# Patient Record
Sex: MALE | Race: WHITE | NOT HISPANIC OR LATINO | Employment: FULL TIME
[De-identification: names, ages, dates, MRNs, and addresses within clinical notes are randomized per-mention and may not be internally consistent; named-entity substitution may affect disease eponyms.]

---

## 2017-01-05 ENCOUNTER — RECORDS - HEALTHEAST (OUTPATIENT)
Dept: ADMINISTRATIVE | Facility: OTHER | Age: 27
End: 2017-01-05

## 2017-03-09 ENCOUNTER — RECORDS - HEALTHEAST (OUTPATIENT)
Dept: ADMINISTRATIVE | Facility: OTHER | Age: 27
End: 2017-03-09

## 2017-11-16 ENCOUNTER — RECORDS - HEALTHEAST (OUTPATIENT)
Dept: ADMINISTRATIVE | Facility: OTHER | Age: 27
End: 2017-11-16

## 2018-03-16 ENCOUNTER — RECORDS - HEALTHEAST (OUTPATIENT)
Dept: ADMINISTRATIVE | Facility: OTHER | Age: 28
End: 2018-03-16

## 2018-04-06 ENCOUNTER — RECORDS - HEALTHEAST (OUTPATIENT)
Dept: ADMINISTRATIVE | Facility: OTHER | Age: 28
End: 2018-04-06

## 2019-05-24 ENCOUNTER — COMMUNICATION - HEALTHEAST (OUTPATIENT)
Dept: TELEHEALTH | Facility: CLINIC | Age: 29
End: 2019-05-24

## 2019-05-24 ENCOUNTER — OFFICE VISIT - HEALTHEAST (OUTPATIENT)
Dept: FAMILY MEDICINE | Facility: CLINIC | Age: 29
End: 2019-05-24

## 2019-05-24 DIAGNOSIS — Z00.00 ROUTINE MEDICAL EXAM: ICD-10-CM

## 2019-05-24 DIAGNOSIS — F41.1 GENERALIZED ANXIETY DISORDER: ICD-10-CM

## 2019-05-24 DIAGNOSIS — G47.33 OBSTRUCTIVE SLEEP APNEA SYNDROME: ICD-10-CM

## 2019-05-24 RX ORDER — ACYCLOVIR 400 MG/1
TABLET ORAL
Status: SHIPPED | COMMUNITY
Start: 2019-02-15 | End: 2021-11-15

## 2019-05-24 ASSESSMENT — MIFFLIN-ST. JEOR: SCORE: 2336.24

## 2019-07-19 ENCOUNTER — OFFICE VISIT - HEALTHEAST (OUTPATIENT)
Dept: FAMILY MEDICINE | Facility: CLINIC | Age: 29
End: 2019-07-19

## 2019-07-19 DIAGNOSIS — F41.1 GENERALIZED ANXIETY DISORDER: ICD-10-CM

## 2019-07-19 RX ORDER — HYDROXYZINE HYDROCHLORIDE 25 MG/1
25 TABLET, FILM COATED ORAL EVERY 8 HOURS PRN
Qty: 90 TABLET | Refills: 5 | Status: SHIPPED | OUTPATIENT
Start: 2019-07-19 | End: 2024-03-01

## 2019-08-15 ENCOUNTER — OFFICE VISIT - HEALTHEAST (OUTPATIENT)
Dept: SLEEP MEDICINE | Facility: CLINIC | Age: 29
End: 2019-08-15

## 2019-08-15 DIAGNOSIS — R06.83 SNORING: ICD-10-CM

## 2019-08-15 DIAGNOSIS — G47.8 SLEEP DYSFUNCTION WITH SLEEP STAGE DISTURBANCE: ICD-10-CM

## 2019-08-15 DIAGNOSIS — E66.01 MORBID OBESITY (H): ICD-10-CM

## 2019-08-15 DIAGNOSIS — G47.10 HYPERSOMNIA: ICD-10-CM

## 2019-08-15 ASSESSMENT — MIFFLIN-ST. JEOR: SCORE: 2303.13

## 2019-09-03 ENCOUNTER — RECORDS - HEALTHEAST (OUTPATIENT)
Dept: SLEEP MEDICINE | Facility: CLINIC | Age: 29
End: 2019-09-03

## 2019-09-03 ENCOUNTER — RECORDS - HEALTHEAST (OUTPATIENT)
Dept: ADMINISTRATIVE | Facility: OTHER | Age: 29
End: 2019-09-03

## 2019-09-03 DIAGNOSIS — E66.01 MORBID (SEVERE) OBESITY DUE TO EXCESS CALORIES (H): ICD-10-CM

## 2019-09-03 DIAGNOSIS — G47.8 OTHER SLEEP DISORDERS: ICD-10-CM

## 2019-09-03 DIAGNOSIS — R06.83 SNORING: ICD-10-CM

## 2019-09-03 DIAGNOSIS — G47.10 HYPERSOMNIA, UNSPECIFIED: ICD-10-CM

## 2019-09-12 ENCOUNTER — COMMUNICATION - HEALTHEAST (OUTPATIENT)
Dept: SLEEP MEDICINE | Facility: CLINIC | Age: 29
End: 2019-09-12

## 2019-09-13 ENCOUNTER — COMMUNICATION - HEALTHEAST (OUTPATIENT)
Dept: SLEEP MEDICINE | Facility: CLINIC | Age: 29
End: 2019-09-13

## 2019-09-23 ENCOUNTER — COMMUNICATION - HEALTHEAST (OUTPATIENT)
Dept: SCHEDULING | Facility: CLINIC | Age: 29
End: 2019-09-23

## 2019-09-23 ENCOUNTER — OFFICE VISIT - HEALTHEAST (OUTPATIENT)
Dept: FAMILY MEDICINE | Facility: CLINIC | Age: 29
End: 2019-09-23

## 2019-09-23 DIAGNOSIS — R11.10 VOMITING AND DIARRHEA: ICD-10-CM

## 2019-09-23 DIAGNOSIS — K52.9 GASTROENTERITIS: ICD-10-CM

## 2019-09-23 DIAGNOSIS — R19.7 VOMITING AND DIARRHEA: ICD-10-CM

## 2019-09-23 LAB
ANION GAP SERPL CALCULATED.3IONS-SCNC: 11 MMOL/L (ref 5–18)
BASOPHILS # BLD AUTO: 0 THOU/UL (ref 0–0.2)
BASOPHILS NFR BLD AUTO: 0 % (ref 0–2)
BUN SERPL-MCNC: 14 MG/DL (ref 8–22)
CHLORIDE BLD-SCNC: 104 MMOL/L (ref 98–107)
CO2 SERPL-SCNC: 27 MMOL/L (ref 22–31)
CREAT SERPL-MCNC: 1 MG/DL (ref 0.8–1.5)
EOSINOPHIL # BLD AUTO: 0 THOU/UL (ref 0–0.4)
EOSINOPHIL NFR BLD AUTO: 0 % (ref 0–6)
ERYTHROCYTE [DISTWIDTH] IN BLOOD BY AUTOMATED COUNT: 12.9 % (ref 11–14.5)
GLUCOSE BLD-MCNC: 114 MG/DL (ref 70–125)
HCT VFR BLD AUTO: 44.5 % (ref 40–54)
HGB BLD-MCNC: 14.9 G/DL (ref 14–18)
LYMPHOCYTES # BLD AUTO: 0.7 THOU/UL (ref 0.8–4.4)
LYMPHOCYTES NFR BLD AUTO: 5 % (ref 20–40)
MCH RBC QN AUTO: 28.8 PG (ref 27–34)
MCHC RBC AUTO-ENTMCNC: 33.5 G/DL (ref 32–36)
MCV RBC AUTO: 86 FL (ref 80–100)
MONOCYTES # BLD AUTO: 0.6 THOU/UL (ref 0–0.9)
MONOCYTES NFR BLD AUTO: 4 % (ref 2–10)
NEUTROPHILS # BLD AUTO: 12.7 THOU/UL (ref 2–7.7)
NEUTROPHILS NFR BLD AUTO: 90 % (ref 50–70)
PLATELET # BLD AUTO: 198 THOU/UL (ref 140–440)
PMV BLD AUTO: 12 FL (ref 8.5–12.5)
POTASSIUM BLD-SCNC: 4.1 MMOL/L (ref 3.5–5.5)
RBC # BLD AUTO: 5.17 MILL/UL (ref 4.4–6.2)
SODIUM SERPL-SCNC: 142 MMOL/L (ref 136–145)
WBC: 14.1 THOU/UL (ref 4–11)

## 2020-04-02 ENCOUNTER — COMMUNICATION - HEALTHEAST (OUTPATIENT)
Dept: FAMILY MEDICINE | Facility: CLINIC | Age: 30
End: 2020-04-02

## 2020-04-10 ENCOUNTER — COMMUNICATION - HEALTHEAST (OUTPATIENT)
Dept: SCHEDULING | Facility: CLINIC | Age: 30
End: 2020-04-10

## 2020-04-10 DIAGNOSIS — J45.40 MODERATE PERSISTENT ASTHMA WITHOUT COMPLICATION: ICD-10-CM

## 2020-11-06 ENCOUNTER — OFFICE VISIT - HEALTHEAST (OUTPATIENT)
Dept: FAMILY MEDICINE | Facility: CLINIC | Age: 30
End: 2020-11-06

## 2020-11-06 DIAGNOSIS — F41.1 GENERALIZED ANXIETY DISORDER: ICD-10-CM

## 2020-11-06 DIAGNOSIS — R53.82 CHRONIC FATIGUE: ICD-10-CM

## 2020-11-06 DIAGNOSIS — J45.40 MODERATE PERSISTENT ASTHMA WITHOUT COMPLICATION: ICD-10-CM

## 2020-11-06 DIAGNOSIS — Z00.00 ROUTINE MEDICAL EXAM: ICD-10-CM

## 2020-11-06 DIAGNOSIS — Z23 NEED FOR TETANUS BOOSTER: ICD-10-CM

## 2020-11-06 DIAGNOSIS — Z13.220 SCREENING, LIPID: ICD-10-CM

## 2020-11-06 DIAGNOSIS — E66.01 CLASS 3 SEVERE OBESITY DUE TO EXCESS CALORIES WITHOUT SERIOUS COMORBIDITY WITH BODY MASS INDEX (BMI) OF 40.0 TO 44.9 IN ADULT (H): ICD-10-CM

## 2020-11-06 DIAGNOSIS — E66.813 CLASS 3 SEVERE OBESITY DUE TO EXCESS CALORIES WITHOUT SERIOUS COMORBIDITY WITH BODY MASS INDEX (BMI) OF 40.0 TO 44.9 IN ADULT (H): ICD-10-CM

## 2020-11-06 LAB
ALBUMIN SERPL-MCNC: 4.4 G/DL (ref 3.5–5)
ALP SERPL-CCNC: 61 U/L (ref 45–120)
ALT SERPL W P-5'-P-CCNC: 26 U/L (ref 0–45)
ANION GAP SERPL CALCULATED.3IONS-SCNC: 10 MMOL/L (ref 5–18)
AST SERPL W P-5'-P-CCNC: 19 U/L (ref 0–40)
BASOPHILS # BLD AUTO: 0 THOU/UL (ref 0–0.2)
BASOPHILS NFR BLD AUTO: 1 % (ref 0–2)
BILIRUB SERPL-MCNC: 0.4 MG/DL (ref 0–1)
BUN SERPL-MCNC: 17 MG/DL (ref 8–22)
CALCIUM SERPL-MCNC: 9.7 MG/DL (ref 8.5–10.5)
CHLORIDE BLD-SCNC: 104 MMOL/L (ref 98–107)
CHOLEST SERPL-MCNC: 194 MG/DL
CO2 SERPL-SCNC: 25 MMOL/L (ref 22–31)
CREAT SERPL-MCNC: 1.1 MG/DL (ref 0.7–1.3)
EOSINOPHIL # BLD AUTO: 0.2 THOU/UL (ref 0–0.4)
EOSINOPHIL NFR BLD AUTO: 4 % (ref 0–6)
ERYTHROCYTE [DISTWIDTH] IN BLOOD BY AUTOMATED COUNT: 13.5 % (ref 11–14.5)
FASTING STATUS PATIENT QL REPORTED: YES
GFR SERPL CREATININE-BSD FRML MDRD: >60 ML/MIN/1.73M2
GLUCOSE BLD-MCNC: 93 MG/DL (ref 70–125)
HCT VFR BLD AUTO: 41.3 % (ref 40–54)
HDLC SERPL-MCNC: 47 MG/DL
HGB BLD-MCNC: 14.2 G/DL (ref 14–18)
LDLC SERPL CALC-MCNC: 115 MG/DL
LYMPHOCYTES # BLD AUTO: 2.1 THOU/UL (ref 0.8–4.4)
LYMPHOCYTES NFR BLD AUTO: 39 % (ref 20–40)
MCH RBC QN AUTO: 29.6 PG (ref 27–34)
MCHC RBC AUTO-ENTMCNC: 34.3 G/DL (ref 32–36)
MCV RBC AUTO: 86 FL (ref 80–100)
MONOCYTES # BLD AUTO: 0.4 THOU/UL (ref 0–0.9)
MONOCYTES NFR BLD AUTO: 8 % (ref 2–10)
NEUTROPHILS # BLD AUTO: 2.5 THOU/UL (ref 2–7.7)
NEUTROPHILS NFR BLD AUTO: 48 % (ref 50–70)
PLATELET # BLD AUTO: 152 THOU/UL (ref 140–440)
PMV BLD AUTO: 9.2 FL (ref 7–10)
POTASSIUM BLD-SCNC: 4.3 MMOL/L (ref 3.5–5)
PROT SERPL-MCNC: 7.5 G/DL (ref 6–8)
RBC # BLD AUTO: 4.8 MILL/UL (ref 4.4–6.2)
SODIUM SERPL-SCNC: 139 MMOL/L (ref 136–145)
TRIGL SERPL-MCNC: 161 MG/DL
TSH SERPL DL<=0.005 MIU/L-ACNC: 1.04 UIU/ML (ref 0.3–5)
WBC: 5.3 THOU/UL (ref 4–11)

## 2020-11-06 ASSESSMENT — PATIENT HEALTH QUESTIONNAIRE - PHQ9: SUM OF ALL RESPONSES TO PHQ QUESTIONS 1-9: 5

## 2020-11-06 ASSESSMENT — ANXIETY QUESTIONNAIRES
6. BECOMING EASILY ANNOYED OR IRRITABLE: SEVERAL DAYS
2. NOT BEING ABLE TO STOP OR CONTROL WORRYING: NOT AT ALL
GAD7 TOTAL SCORE: 4
7. FEELING AFRAID AS IF SOMETHING AWFUL MIGHT HAPPEN: NOT AT ALL
5. BEING SO RESTLESS THAT IT IS HARD TO SIT STILL: MORE THAN HALF THE DAYS
1. FEELING NERVOUS, ANXIOUS, OR ON EDGE: NOT AT ALL
IF YOU CHECKED OFF ANY PROBLEMS ON THIS QUESTIONNAIRE, HOW DIFFICULT HAVE THESE PROBLEMS MADE IT FOR YOU TO DO YOUR WORK, TAKE CARE OF THINGS AT HOME, OR GET ALONG WITH OTHER PEOPLE: SOMEWHAT DIFFICULT
3. WORRYING TOO MUCH ABOUT DIFFERENT THINGS: NOT AT ALL
4. TROUBLE RELAXING: SEVERAL DAYS

## 2020-11-06 ASSESSMENT — MIFFLIN-ST. JEOR: SCORE: 2425.03

## 2020-11-09 LAB
25(OH)D3 SERPL-MCNC: 27.9 NG/ML (ref 30–80)
25(OH)D3 SERPL-MCNC: 27.9 NG/ML (ref 30–80)

## 2020-11-11 LAB
SHBG SERPL-SCNC: 26 NMOL/L (ref 11–80)
TESTOST FREE SERPL-MCNC: 8.49 NG/DL (ref 4.7–24.4)
TESTOST SERPL-MCNC: 370 NG/DL (ref 240–950)

## 2020-12-15 ENCOUNTER — HOSPITAL ENCOUNTER (OUTPATIENT)
Dept: NUTRITION | Facility: CLINIC | Age: 30
Discharge: HOME | End: 2020-12-15
Attending: FAMILY MEDICINE

## 2020-12-15 DIAGNOSIS — E66.01 CLASS 3 SEVERE OBESITY DUE TO EXCESS CALORIES WITHOUT SERIOUS COMORBIDITY WITH BODY MASS INDEX (BMI) OF 40.0 TO 44.9 IN ADULT (H): ICD-10-CM

## 2020-12-15 DIAGNOSIS — E66.813 CLASS 3 SEVERE OBESITY DUE TO EXCESS CALORIES WITHOUT SERIOUS COMORBIDITY WITH BODY MASS INDEX (BMI) OF 40.0 TO 44.9 IN ADULT (H): ICD-10-CM

## 2021-04-16 ENCOUNTER — COMMUNICATION - HEALTHEAST (OUTPATIENT)
Dept: ADMINISTRATIVE | Facility: CLINIC | Age: 31
End: 2021-04-16

## 2021-04-16 DIAGNOSIS — J45.40 MODERATE PERSISTENT ASTHMA WITHOUT COMPLICATION: ICD-10-CM

## 2021-04-16 RX ORDER — ALBUTEROL SULFATE 90 UG/1
2 AEROSOL, METERED RESPIRATORY (INHALATION) EVERY 6 HOURS PRN
Qty: 18 G | Refills: 3 | Status: SHIPPED | OUTPATIENT
Start: 2021-04-16 | End: 2022-04-26

## 2021-04-20 ENCOUNTER — COMMUNICATION - HEALTHEAST (OUTPATIENT)
Dept: FAMILY MEDICINE | Facility: CLINIC | Age: 31
End: 2021-04-20

## 2021-04-20 DIAGNOSIS — J45.40 MODERATE PERSISTENT ASTHMA WITHOUT COMPLICATION: ICD-10-CM

## 2021-04-20 RX ORDER — ALBUTEROL SULFATE 0.83 MG/ML
SOLUTION RESPIRATORY (INHALATION)
Qty: 60 VIAL | Refills: 2 | Status: SHIPPED | OUTPATIENT
Start: 2021-04-20 | End: 2021-11-15

## 2021-05-27 ASSESSMENT — PATIENT HEALTH QUESTIONNAIRE - PHQ9: SUM OF ALL RESPONSES TO PHQ QUESTIONS 1-9: 5

## 2021-05-28 ASSESSMENT — ANXIETY QUESTIONNAIRES: GAD7 TOTAL SCORE: 4

## 2021-05-28 ASSESSMENT — ASTHMA QUESTIONNAIRES: ACT_TOTALSCORE: 24

## 2021-05-30 NOTE — PROGRESS NOTES
1. Generalized anxiety disorder  hydrOXYzine HCl (ATARAX) 25 MG tablet    FLUoxetine (PROZAC) 40 MG capsule    traZODone (DESYREL) 50 MG tablet        Plan: We will refill his fluoxetine at 40 mg a day as he has been doing.  That seems to be working pretty well to maintain his anxiety levels during the day.  He has no significant side effects or problems with this medication.    He also uses hydroxyzine at times during the day because it does not sedate him all that much, and he uses it more for panic type symptoms with good success so we will refill that for him to use as well.    For sleep however, he is not terribly happy with the medications right now, and is tried some over-the-counter medications and is interested in trying something a little different.  We will give him some trazodone 50 mg at at bedtime to use for sleep.  We talked about side effects and interactions with the medications that he is on.  He is willing to give it a try and let us know how it works.  We will follow-up with him about a month to see how things are going for that.    25 minutes total time spent together with the patient today, more than 50% of that time spent in counseling, discussing the above topics.       Subjective: 29-year-old male is here today for a follow-up.  We saw him about a month ago and at that time we had started up his medications that he has been on for anxiety in the past, including fluoxetine at 40 mg a day and hydroxyzine that he uses occasionally for panic type symptoms.  He is reporting that during the day he is doing pretty well, and has no side effects or problems with medications.  The hydroxyzine normally helps him with his panic feelings but does not sedate him or make it impossible for him to work or do his normal activities.    He also states though, that it does not really help him sleep, so is wondering if there is something else he can take at night that might help him sleep a little bit better.  He  has a hard time getting to sleep once he gets to sleep he can usually do pretty well for the rest of the night.  If he does wake up in the night however he will remain awake for some time.    Objective: Well-appearing male no acute distress.  Vital signs as noted.  He is alert and oriented and interactive and pleasant today.  Chest clear to auscultation.  Heart regular rate and rhythm.  Neurologically intact.

## 2021-05-31 NOTE — PATIENT INSTRUCTIONS - HE
What is a Home Sleep Study?    your doctor can give you a portable sleep monitor to use at home, so you don t have to spend the night in the sleep lab. But you should use a portable monitor only if:   ?Your doctor thinks you have a condition that makes you stop breathing for short periods while you are asleep, called  sleep apnea.    ?You do not have other serious medical problems, such as heart disease or lung disease.    Please bring the home sleep study device back to the sleep center as soon as you are finished with it so we can score it.     The cost of care estimate line is [phone]. They are able to give the patient an estimate of the charges and also an estimate of their insurance coverage/patient responsibility.

## 2021-05-31 NOTE — PROGRESS NOTES
Dear Dr. Phillip, Kalen CHAIREZ Md  [address] [place] [address]    Thank you for the opportunity to participate in the care of Mr. Ty Ramirez.    He is a 29 y.o. male who comes to the clinic with a chief complaint of excessive daytime sleepiness is been going on for more than a year.  The patient denies any episodes of witnessed apnea he has been told that he does have loud snoring during sleep.  His spouse also complains of observing him having irregular breathing during sleep.  Patient's review of systems otherwise negative.     Ideal Sleep-Wake Cycle(devoid of societal pressure):    Patient would try to initiate sleep at around 10:30 PM with a sleep latency of 15-20 minutes. The patient would have 1-2 awakening. Final wake up time is around 8:30 AM.    Past Medical History  Past Medical History:   Diagnosis Date     Substance abuse (H)     alcohol        Past Surgical History  Past Surgical History:   Procedure Laterality Date     TONSILLECTOMY          Meds  Current Outpatient Medications   Medication Sig Dispense Refill     acyclovir (ZOVIRAX) 400 MG tablet Take two (2) tablets by mouth daily for 5 days at onset of outbreak       albuterol (PROVENTIL HFA) 90 mcg/actuation inhaler Inhale 2 puffs.       albuterol (PROVENTIL) 2.5 mg /3 mL (0.083 %) nebulizer solution Inhale 2.5 mg.       FLUoxetine (PROZAC) 40 MG capsule Take 1 capsule (40 mg total) by mouth daily. 90 capsule 3     fluticasone propionate (FLOVENT HFA) 220 mcg/actuation inhaler Inhale 2 puffs.       gabapentin (NEURONTIN) 300 MG capsule TAKE 1 CAPSULES BY MOUTH THREE TIMES A DAY.       hydrOXYzine HCl (ATARAX) 25 MG tablet Take 1 tablet (25 mg total) by mouth every 8 (eight) hours as needed for anxiety. 90 tablet 5     loratadine-pseudoephedrine (CLARITIN-D 24 HOUR)  mg per 24 hr tablet        traZODone (DESYREL) 50 MG tablet take 1 tab by mouth at bedtime as needed for sleep 90 tablet 3     No current facility-administered  medications for this visit.         Allergies  Patient has no known allergies.     Social History  Social History     Socioeconomic History     Marital status: Single     Spouse name: Not on file     Number of children: Not on file     Years of education: Not on file     Highest education level: Not on file   Occupational History     Not on file   Social Needs     Financial resource strain: Not on file     Food insecurity:     Worry: Not on file     Inability: Not on file     Transportation needs:     Medical: Not on file     Non-medical: Not on file   Tobacco Use     Smoking status: Never Smoker     Smokeless tobacco: Never Used   Substance and Sexual Activity     Alcohol use: Not Currently     Frequency: Never     Comment: sober since 10/17/2017     Drug use: Never     Sexual activity: Yes     Partners: Female   Lifestyle     Physical activity:     Days per week: Not on file     Minutes per session: Not on file     Stress: Not on file   Relationships     Social connections:     Talks on phone: Not on file     Gets together: Not on file     Attends Sikh service: Not on file     Active member of club or organization: Not on file     Attends meetings of clubs or organizations: Not on file     Relationship status: Not on file     Intimate partner violence:     Fear of current or ex partner: Not on file     Emotionally abused: Not on file     Physically abused: Not on file     Forced sexual activity: Not on file   Other Topics Concern     Not on file   Social History Narrative     Not on file        Family History  Family History   Problem Relation Age of Onset     Hypertension Mother      Hypertension Father      Hypertension Maternal Aunt      Diabetes Maternal Aunt      Hypertension Maternal Uncle      Diabetes Maternal Uncle      Hypertension Maternal Grandmother      Heart disease Maternal Grandfather      Hypertension Maternal Grandfather         Review of Systems:  Constitutional: Negative except as noted  in HPI.   Eyes: Negative except as noted in HPI.   ENT: Negative except as noted in HPI.   Cardiovascular: Negative except as noted in HPI.   Respiratory: Negative except as noted in HPI.   Gastrointestinal: Negative except as noted in HPI.   Genitourinary: Negative except as noted in HPI.   Musculoskeletal: Negative except as noted in HPI.   Integumentary: Negative except as noted in HPI.   Neurological: Negative except as noted in HPI.   Psychiatric: Negative except as noted in HPI.   Endocrine: Negative except as noted in HPI.   Hematologic/Lymphatic: Negative except as noted in HPI.      STOP BANG 8/15/2019   Do you snore loudly (louder than talking or loud enough to be heard through closed doors)? 1   Do you often feel tired, fatigued, or sleepy during daytime? 1   Has anyone observed you stop breathing in your sleep? 1   Do you have or are you being treated for high blood pressure? 1   BMI more than 35 kg/m2 1   Age over 50 years old? 0   Neck circumference greater than 16 inches? 1   Gender male? 1   Total Score 7     Epworths Sleepiness Scale 8/15/2019   Sitting and reading 2   Watching TV 2   Sitting, inactive in a public place (e.g. a theatre or a meeting) 2   As a passenger in a car for an hour without a break 3   Lying down to rest in the afternoon when circumstances permit 2   Sitting and talking to someone 0   Sitting quietly after a lunch without alcohol 1   In a car, while stopped for a few minutes in traffic 2   Total score 14     Rooming 8/15/2019   Usual bedtime 9   Sleep Latency 15 minutes   Awakenings 1-2 times   Wake Up Time 5:00   Weekends varies   Energy Drinks no   Coffee yes   Cola no   Difficulty falling asleep No   Difficulty staying asleep Yes   Excessive daytime tiredness Yes   Excessive daytime sleepiness Yes   Dozing off while driving Yes   Shift Worker No   Sleep Walking? No   Sleep Talking? Yes   Kicking or punching? Yes   Restless legs symptoms Yes       Physical Exam:  /75   " Pulse 74   Ht 5' 10.75\" (1.797 m)   Wt (!) 291 lb (132 kg)   SpO2 97%   BMI 40.87 kg/m    BMI:Body mass index is 40.87 kg/m .   GEN: NAD, morbidly obese  Head: Normocephalic.  EYES: PERRLA, EOMI  ENT: Oropharynx is clear, Julian class 4+ airway.   Nasal mucosa is moist without erythema  Neck : Thyroid is within normal limits. Neck Circ 17.75\"  CV: Regular rate and rhythm, S1 & S2 positive.  LUNGS: Bilateral breathsounds heard.   ABDOMEN: Positive bowel sounds in all quadrants, soft, no rebound or guarding  MUSCULOSKELETAL: Bilateral trace leg swelling  SKIN: warm, dry, no rashes  Neurological: Alert, oriented to time, place, and person.  Psych: normal mood, normal affect        Assessment and Plan:  In summary Ty Ramirez is a 29 y.o. year old male here for sleep disturbance.  1.  Hypersomnia   Mr. Ty Ramirez has high risk for obstructive sleep apnea based on the hypersomnia, snoring and a crowded airway. I educated the patient on the underlying pathophysiology of obstructive sleep apnea. We reviewed the risks associated with sleep apnea, including increased cardiovascular risk and overall death. We talked about treatments briefly. I recommend getting a Home sleep study. The patient should return to the clinic to discuss results and treatment option in a patient-centered approach.  2.  Snoring  3.  Other sleep disturbance  4.  Obesity    Patient verbalized understanding of these issues, agrees with the plan and all questions were answered today. Patient was given an opportuntity to voice any other symptoms or concerns not listed above. Patient did not have any other symptoms or concerns.      Patient told to return in one week after the sleep study is interpreted.      Levon Goodwin DO  Board Certified in Internal Medicine and Sleep Medicine  [facility].    (Note created with Dragon voice recognition and unintended spelling errors and word substitutions may occur)     "

## 2021-06-01 NOTE — TELEPHONE ENCOUNTER
Overnight polysomnography was reviewed.   The patient had snoring but did not rule in for obstructive sleep apnea. Please call the patient to offer cancellation or an earlier follow up visit. May double book except for my 1/2 days.Thank you.

## 2021-06-01 NOTE — TELEPHONE ENCOUNTER
I spoke to Jude today 9/13/2019. Results given. He does not have any other questions. He does not need a follow up visit. Requested a copy of sleep study record be mailed to him. I will send this out today.

## 2021-06-01 NOTE — TELEPHONE ENCOUNTER
Attempted to reach Jude today 9/12/2019 at [phone]. No answer. [facility] for results

## 2021-06-01 NOTE — TELEPHONE ENCOUNTER
"Patient calling - says ever since noon today he has been throwing up non-stop and having non-stop diarrhea.  Has vomited at least 20 times and about just as many loose stools.  Last few times he vomited black \"coffee ground like stuff.\"  Has abdominal pain around belly button.  Rates pain 10/10 at its worst.  Pain is 3/10 now.  Feels dizzy but is able to walk.    No fever.    Triaged to disposition of Go to ED Now.  Patient intends to have his girlfriend drive him to [facility] ED.    Marita Kessler RN  Triage Nurse Advisor    Reason for Disposition    [1] Vomiting AND [2] contains red blood or black (\"coffee ground\") material  (Exception: few red streaks in vomit that only happened once)    Protocols used: VOMITING-A-AH      "

## 2021-06-01 NOTE — PROGRESS NOTES
"WALK IN CARE - VISIT NOTE    ASSESSMENT/PLAN  1. Vomiting and diarrhea  2. Gastroenteritis  Final CBC pending time signed this note, but preliminary shows WBC to 14.1 with neutrophil predominance, sent to lab for confirmation.  Electrolytes WNL.  Afebrile.  Given the acuity of his symptoms I am suspicious for an infective process.  Stool studies ordered with instructions to follow-up with PCP to discuss results.  If infectious work-up is negative, patient may benefit from further inflammatory work-up.  - ISTAT CHEM 7 ([facility] ONLY)  - HM1(CBC and Differential)  - HM1 (CBC with Diff)  - Culture, Stool; Future  - Giardia Detection, Stool; Future  - Ova and Parasite, Stool; Future  - Fecal WBC's; Future    Follow-up with PCP end of the week or next to discuss results and possible further studies.  Options for treatment and follow-up care were reviewed with the patient and/or guardian. Discussed symptoms in which to return to clinic sooner or go to the ER for evaluation. Ty Ramirez and/or guardian engaged in the decision making process and verbalized understanding of the options discussed and agreed with the final plan.    Jerald Cast MD     HPI    Ty Ramirez is a 29 y.o. male brought in by self presenting for evaluation of vomiting and diarrhea:    Vomiting and diarrhea started around noon today  Stopped around 4:00pm  \"this isn't the first time this has happened\"  Thinks maybe a couple times in the past few months  Is vomiting small amounts of blood  No blood in stools  Does have Hx of alcohol abuse - quit about 2 years ago  No light headed or dizzy   Still urinating the same  Able to tolerate PO now, during the 4h wasn't  No autoimmune conditions  No known celiacs  No known bowel disease  Not drinking fresh water out of streams/lakes      ROS  Complete ROS negative except as noted in HPI.    Social History     Tobacco Use     Smoking status: Never Smoker     Smokeless tobacco: Never Used   Substance " Use Topics     Alcohol use: Not Currently     Frequency: Never     Comment: sober since 10/17/2017     Drug use: Never       Past Medical History:   Diagnosis Date     Substance abuse (H)     alcohol     Past Surgical History:   Procedure Laterality Date     TONSILLECTOMY           OBJECTIVE  Vitals:    09/23/19 1831   BP: 100/70   Pulse: 95   Resp: 16   Temp: 98.7  F (37.1  C)   SpO2: 97%       Physical Exam  General: No acute distress, sitting comfortable in chair  Eyes:  normal conjunctiva, normal sclera   Ears:  external ears normal  Nose:  no rhinorrhea  Neck: good ROM, supple  CV: RRR, distal and peripheral pulses in tact  Resp: CTA bilaterally, no wheezing, no rhonchi, no rhales, no respiratory distress  Abdomen: soft, trace tenderness throughout, normal bowel sounds  Neuro: moves all 4 extremities, no focal deficits noted  Extrem: no edema, no cyanosis, well-perfused    Labs    Results for orders placed or performed in visit on 09/23/19   ISTAT CHEM 7 ([facility] CLINIC ONLY)   Result Value Ref Range    Sodium 142 136 - 145 mmol/L    Potassium 4.1 3.5 - 5.5 mmol/L    CO2 27 22 - 31 mmol/L    Chloride 104 98 - 107 mmol/L    Anion Gap, Calculation 11 5 - 18 mmol/L    Glucose 114 70 - 125 mg/dL    BUN 14 8 - 22 mg/dL    Creatinine 1.00 0.80 - 1.50 mg/dL     CBC collected and shows a WBC to 14.1 with neutrophil predominance, sent to [facility] for confirmation.

## 2021-06-02 VITALS — WEIGHT: 298.3 LBS | HEIGHT: 71 IN | BODY MASS INDEX: 41.76 KG/M2

## 2021-06-03 VITALS
SYSTOLIC BLOOD PRESSURE: 100 MMHG | HEART RATE: 95 BPM | BODY MASS INDEX: 40.48 KG/M2 | OXYGEN SATURATION: 97 % | RESPIRATION RATE: 16 BRPM | TEMPERATURE: 98.7 F | DIASTOLIC BLOOD PRESSURE: 70 MMHG | WEIGHT: 288.2 LBS

## 2021-06-03 VITALS — BODY MASS INDEX: 41.31 KG/M2 | WEIGHT: 294.1 LBS

## 2021-06-03 VITALS — WEIGHT: 291 LBS | HEIGHT: 71 IN | BODY MASS INDEX: 40.74 KG/M2

## 2021-06-04 VITALS
DIASTOLIC BLOOD PRESSURE: 80 MMHG | HEART RATE: 60 BPM | SYSTOLIC BLOOD PRESSURE: 124 MMHG | OXYGEN SATURATION: 97 % | HEIGHT: 72 IN | WEIGHT: 313.5 LBS | BODY MASS INDEX: 42.46 KG/M2

## 2021-06-07 NOTE — TELEPHONE ENCOUNTER
RN cannot approve Refill Request    RN can NOT refill this medication historical medication requested.     Last office visit: 9/23/2019      Yandy Hinds, [facility] Triage/Med Refill 4/10/2020    Requested Prescriptions   Pending Prescriptions Disp Refills     albuterol (PROVENTIL HFA) 90 mcg/actuation inhaler  0     Sig: Inhale 2 puffs.       There is no refill protocol information for this order        albuterol (PROVENTIL) 2.5 mg /3 mL (0.083 %) nebulizer solution  0     Sig: 3 mL (2.5 mg total).       There is no refill protocol information for this order

## 2021-06-07 NOTE — TELEPHONE ENCOUNTER
"Patient needs refill of asthma medication. He is not experiencing any symptoms or shortness of breath today. Simply would like a re-fill.    Request for prescription refill for albuterol inhaler AND liquid albuterol for nebulizer use.    Veda Gomez RN      Reason for Disposition    Caller requesting a refill, no triage required, and triager able to refill per unit policy    Answer Assessment - Initial Assessment Questions  1. SYMPTOMS: \"Do you have any symptoms?\"      Denies  2. SEVERITY: If symptoms are present, ask \"Are they mild, moderate or severe?\"      Denies    Protocols used: MEDICATION QUESTION CALL-A-AH      "

## 2021-06-13 NOTE — PROGRESS NOTES
"Nutrition Assessment    Patient seen for outpatient MNT initial assessment.    Ty Ramirez is a 30 y.o. male who is being evaluated via a billable telephone visit.      The patient has been notified of following:     \"This telephone visit will be conducted via a call between you and your physician/provider. We have found that certain health care needs can be provided without the need for a physical exam.  This service lets us provide the care you need with a short phone conversation.    Telephone visits are billed at different rates depending on your insurance coverage. During this emergency period, for some insurers they may be billed the same as an in-person visit.  Please reach out to your insurance provider with any questions.    If during the course of the call the physician/provider feels a telephone visit is not appropriate, you will not be charged for this service.\"    Patient has given verbal consent to a Telephone visit? Yes    Phone call duration: 45 minutes  Start: 8:30 am  Stop: 9:15 am  Anita Villegas RD    Referring diagnosis: Class 3 severe obesity due to excess calories without serious comorbidity with body mass index (BMI) of 40.0 to 44.9 in adult (H)    Height: Height: 6' (1.829 m)  Weight: 313 lb (11/6/20 office visit)  BMI:42.5  BMI indication: >40 extreme obesity (class 3)  Patient's Goal Weight: Initially 5 -10% of current BW    Labs: cholesterol 194, HDL cholesterol 47, LDL cholesterol - wdl  Glucose 93 wdl, Triglycerides 161 H, Vit. D 27.9 L    Has strong family history of diabetes and heart disease    Lifestyle changes made prior to nutrition session:    Sober 3 years  Eats a healthy breakfast and lunch  Tries to drink 8 glasses of water daily  Taking his Vit D recently prescribed for low Vit D level  Walks ~11,000 steps daily at work     Assessment of diet/weight history:   ~6:30 am Breakfast: egg sandwich, 2 slices toast, egg, piece of pepper ani or cheddar, black coffee  ~5:00 " pmLunch: turkey and cheese sandwich, sometimes with lettuce  ~ after 8:00 pm d/t current work schedule Snacks/maybe a meal (this is when pt is not eating well  Drinks plain tea, and sparkling water    Assessment of physical activity:lots of walking at work, resistance exercise (push ups, bands) at home - unable to go to gym currently.  Builds muscle and puts on fat quickly - muscles are responsive to weight lifting    Nutrition intervention that addressed medical nutrition therapy for above diagnosis: Weight management and lipid lowering strategies.  <20 gm saturated fat/day, ~60 gm carbohydrate per + snack  Pt has done intermittent fasting in the past.  RD okay with idea as long as pt is taking in adequate nutrition.    RD reviewed eating out/fast foods, label reading and physical activity.    Education materials provided: Weight loss tips, Weight management cooking tips, Vit D foods, High triglyceride nutrition therapy, snack ideas    Goals: Gradual weight loss with goal of 5% then 10% to start.  1. Writing down a regular days worth of eating and drinking and analyzing for saturated fat and carbohydrate  2. Encouraged menu planning with shopping lists with addition of 1 to 2 fruits daily, pt likes vegetables and salads.  3. Consider meal time changes - moving lunch up in the day, and a lighter meal in the evening  4.. Regular exercise combination of weight resistance, and aerobic (walking at a faster rate for short periods of time at first, as part of usual  Walking at work)    Patient had no barriers to learning, verbalized understanding, and compliance is expected.    Phone number provided for questions. Recommended follow-up in 4-6 weeks.    Thank you for this consult. Call me at [phone] for questions.

## 2021-06-16 PROBLEM — E66.01 CLASS 3 SEVERE OBESITY DUE TO EXCESS CALORIES WITHOUT SERIOUS COMORBIDITY WITH BODY MASS INDEX (BMI) OF 40.0 TO 44.9 IN ADULT (H): Status: ACTIVE | Noted: 2019-08-15

## 2021-06-16 PROBLEM — G47.33 OBSTRUCTIVE SLEEP APNEA SYNDROME: Status: ACTIVE | Noted: 2019-05-24

## 2021-06-16 PROBLEM — E66.813 CLASS 3 SEVERE OBESITY DUE TO EXCESS CALORIES WITHOUT SERIOUS COMORBIDITY WITH BODY MASS INDEX (BMI) OF 40.0 TO 44.9 IN ADULT (H): Status: ACTIVE | Noted: 2019-08-15

## 2021-06-16 PROBLEM — F41.1 GENERALIZED ANXIETY DISORDER: Status: ACTIVE | Noted: 2019-05-24

## 2021-06-16 NOTE — TELEPHONE ENCOUNTER
Refill request for medication: albuterol (PROVENTIL) 2.5 mg /3 mL (0.083 %) nebulizer solution  Last visit addressing this medication: 11/6/2020  Follow up plan 12  months  Last refill on 4/10/2020, quantity #60, 2 refills    Appointment: Not due   Appointment recommended at least every 3 months for opioid prescriptions. Appointment recommended at least every 6 months for ADHD medications.    Jennifer Schmitt MA

## 2021-06-17 NOTE — PATIENT INSTRUCTIONS - HE
Patient Instructions by Jerald Cast MD at 9/23/2019  5:50 PM     Author: Jerald Cast MD Service: -- Author Type: Resident    Filed: 9/23/2019  7:41 PM Encounter Date: 9/23/2019 Status: Signed    : Jerald Cast MD (Resident)       Patient Education     Bacterial Gastroenteritis (Adult)    You have gastroenteritis. It is an infection in your intestinal tract caused by bacteria (dysentery). Viruses, parasites, and toxins may also cause gastroenteritis. This infection may cause fever, vomiting, stomach cramping, and diarrhea. You may have blood or mucus in your stool. Some of the more common causes of bacterial gastroenteritis include E. coli, salmonella, shigella, campylobacter, and clostridium difficile.  Antibiotics are often used to treat this type of infection. Sometimes you may need to wait until a stool culture is done before your healthcare provider gives you an antibiotic. In the meantime, follow the advice below. Do not take antibiotics without your provider's recommendation. This can lead to other complications in certain types of bacterial gastroenteritis.  Home care  Medicines    If your healthcare provider prescribed antibiotics, be sure you take them until they are finished.    You may use acetaminophen or NSAID medicines like ibuprofen or naproxen to control fever unless another medicine was prescribed. If you have chronic liver or kidney disease, talk with your provider before using these medicines. Also talk with your provider if you've had a stomach ulcer or gastrointestinal bleeding. Don't give aspirin to anyone under 18 years of age who is ill with a fever. It may cause severe liver damage. Don't use NSAID medicines if you are already taking one for another condition (like arthritis) or are on aspirin such as for heart disease or after a stroke.    Don't take or give over-the-counter antidiarrhea medicines, unless your healthcare provider prescribed them. Antidiarrhea medicine  may make your symptoms last longer if the cause is an infectious diarrhea.    You may be given medicine for nausea and vomiting to help you keep down fluids. Take these medicines as prescribed.    Gastroenteritis is transmitted by contact with the stool or vomit of an infected person. This can occur directly from person to person or indirectly from contact with a contaminated surface.  General care    If symptoms are severe, rest at home for the next 24 hours, or until you feel better.    Washing your hands with soap and water and using alcohol-bases  is the best way to stop the spread of infection. Wash your hands after touching anyone who is sick.    Wash your hands after using the toilet and before meals. Clean the toilet after each use.    Don't use tobacco, caffeine, or alcohol. These can make your symptoms worse.  Diet  Liquids are the first step:    Water and clear liquids are important so you don't get dehydrated. Drink a small amount at a time or suck on ice chips.  Food    People with diarrhea should not make or serve food for others. When making food for yourself, wash your hands before and after.    Wash your hands after using cutting boards, countertops, and knives that have touched raw food.    Keep uncooked meats away from cooked and ready-to-eat foods.  During the first 24 hours, follow the diet below:    Beverages: sport drinks, soft drinks without caffeine, mineral water (plain or flavored), decaffeinated tea and coffee. If you are very dehydrated, sports drinks are not a good choice. They have too much sugar and not enough electrolytes. In this case, commercially available products called oral rehydration solutions, are best.    Soups: clear broth, consommé, and bouillon    Desserts: plain gelatin, popsicles, and fruit juice bars  During the next 24 hours (the second day), you may add these foods to the above list if you are feeling better. If not, continue what you did the first  day:    Hot cereal, plain toast, bread, rolls, crackers    Plain noodles, rice, mashed potatoes, chicken noodle or rice soup    Unsweetened canned fruit (avoid pineapple), bananas    Limit fat to less than 15 grams per day. Avoid margarine, butter, oils, mayonnaise, sauces, gravies, fried foods, peanut butter, meat, poultry, and fish.    Limit fiber. Avoid raw or cooked vegetables, fresh fruits (except bananas), and bran cereals.    Limit dairy    Continue to avoid alcohol    Limit caffeine and chocolate. No spices or seasonings except salt.  During the next 24 hours:    Gradually resume a normal diet, as you feel better and your symptoms improve.    If at any time you start feeling worse again, go back to clear liquids until you feel better.  Follow-up care  Follow up with your healthcare provider, or as advised. If you don't get better in 24 hours, or if your diarrhea lasts more than 1 week. Also follow up if you are unable to keep liquids down and stay hydrated.  If a stool (diarrhea) sample was taken, you can call for the results as directed.  Call 911  Call 911 if any of these occur:    Trouble breathing    Confused    Very drowsy or trouble awakening    Fainting or loss of consciousness    Rapid heart rate    Chest pain    Seizure    Stiff neck  When to seek medical advice  Call your healthcare provider right away if any of these occur:    Increasing abdominal pain or constant lower right abdominal pain    Continued vomiting (unable to keep liquids down)    Diarrhea for more than 2 days in adults and 24 hours in children    Stools containing blood or pus or black tarry stools    Dark urine, reduced urine output    Weakness, dizziness    Drowsiness    Fever of 100.4 F (38.0 C) or higher; or as directed by your healthcare provider    New rash    If you have muscle weakness or arthritis symptoms during or after your gastroenteritis is gone  Date Last Reviewed: 1/3/2016    2367-2896 [employer] [address]  [place] [address]. All rights reserved. This information is not intended as a substitute for professional medical care. Always follow your healthcare professional's instructions.

## 2021-06-19 NOTE — LETTER
Letter by Levon Goodwin DO at      Author: Levon Goodwin DO Service: -- Author Type: --    Filed:  Encounter Date: 9/13/2019 Status: (Other)       Ty Ramirez  [address] [facility] [address]       September 13, 2019       Dear Mr. Ramirez,      Enclosed is the report from your recent sleep study.     Please call with questions or contact us using [employer].      If you would like to review results in more detail, please call [phone] to schedule appointment with .     Sincerely,      Amanda LUNA [facility]  Sleep Medicine  9/13/2019 11:33 AM    C/O      Levon Goodwin DO

## 2021-06-26 ENCOUNTER — HEALTH MAINTENANCE LETTER (OUTPATIENT)
Age: 31
End: 2021-06-26

## 2021-06-27 NOTE — PROGRESS NOTES
Progress Notes by Kalen Phillip MD at 5/24/2019  1:20 PM     Author: Kalen Phillip MD Service: -- Author Type: Physician    Filed: 5/28/2019 12:12 PM Encounter Date: 5/24/2019 Status: Signed    : Kalen Phillip MD (Physician)       MALE PREVENTATIVE EXAM    Assessment and Plan:       1. Routine medical exam    Generally the patient is doing very well.  We discussed healthy lifestyles, including adequate exercise (3-4 times a week for 20-30 minutes), and a healthy diet.  Patient should return for annual physicals, and we can also see them here as needed.       2. Obstructive sleep apnea syndrome    With his symptoms of daytime sleepiness and feeling like he is not rested in the morning, as well as his body habitus etc., I think a sleep referral would be of benefit to him.  He can follow-up with him after he has had this referral done and what plans were made at that time.    - Ambulatory referral to Sleep Medicine    3. Generalized anxiety disorder    We discussed the current medications that he is on for this, including the gabapentin and that hydroxyzine.  The fluoxetine obviously as well.  He seems happy with this right now we discussed him seeing some counselors for this and he will continue to do that and we can follow-up with him going forward as needed.        Next follow up:  No follow-ups on file.    Immunization Review  Adult Imm Review: No immunizations due today  BMI: 41    I discussed the following with the patient:   Adult Healthy Living: Importance of regular exercise  Healthy nutrition  Weight loss referral options  Getting adequate sleep  Stress management  Use of seat belts    I have had an Advance Directives discussion with the patient.    Subjective:   Chief Complaint: Ty Ramirez is an 29 y.o. male here for a preventative health visit.     HPI: 29-year-old male here today for a physical.  Is a new patient to our clinic.  He generally is stable at this point.  He has some ongoing  generalized anxiety disorder.  He takes medications for this that he gets from his psychiatrist.  He takes fluoxetine at 40 mg a day along with some trazodone to help him sleep at night.  Occasionally, he will take some gabapentin that is been given for anxiety as well.  This program seems to be working pretty well for him.  He states that his symptoms are generally under pretty good control.  He does see mental health for these issues and does come counseling as well.    He also would like to pursue some testing for sleep apnea.  He states that he has been having more snoring and trouble sleeping for the last 6 months or so.  His bed partner is noticed that he has been snoring more and is thinking that sometimes he does stop breathing for a time.  He acknowledges that he has been sleepy during the day at times and finds it hard to stay awake even when he is at work.  He has not really had trouble falling asleep while driving.    Healthy Habits  Are you taking a daily aspirin? No  Do you typically exercising at least 40 min, 3-4 times per week?  Yes  Do you usually eat at least 4 servings of fruit and vegetables a day, include whole grains and fiber and avoid regularly eating high fat foods? NO  Have you had an eye exam in the past two years? Yes  Do you see a dentist twice per year? NO  Do you have any concerns regarding sleep? YESSleep apnea, falling asleep and staying asleep, falling asleep during the day.    Safety Screen  If you own firearms, are they secured in a locked gun cabinet or with trigger locks? The patient does not own any firearms  No data recorded    Review of Systems:  Please see above.  The rest of the review of systems are negative for all systems.     Cancer Screening     Patient has no health maintenance due at this time          Patient Care Team:  Kalen Phillip MD as PCP - General (Family Medicine)        History     Not marked as reviewed during this visit.        Patient is new patient to  "the clinic. Please see past medical history, surgical history, social history and family history, all of which were completed in their entirety today.     Objective:   Vital Signs:   Visit Vitals  /80 (Patient Site: Left Arm, Patient Position: Sitting, Cuff Size: Adult Large)   Pulse 69   Ht 5' 10.75\" (1.797 m)   Wt (!) 298 lb 4.8 oz (135.3 kg)   SpO2 97%   BMI 41.90 kg/m           PHYSICAL EXAM    Well developed, well nourished, no acute distress.  HEENT: normocephalic/atraumatic, PERRLA/EOMI, TMs: Gray, normal light reflex, no nasal discharge.  Oral mucosa: no erythema/exudate  Neck: No LAD/masses/thyromegaly/bruits  Lungs: clear bilaterally  Heart: regular rate and rhythm, no murmurs/gallops/rubs.  Abdomen: Normal bowel sounds, soft, non-tender, non-distended, no masses, neg Peguero's/McBurney's, no rebound/guarding  Genital: Bilaterally descended testes, no masses, non-tender, no hernia.  No urethral discharge or erythema.  No lesions, normal phallus.  Lymphatics: no supraclavicular/axillary/epitrochlear/inguinal LAD. No edema.  Neuro: A&O x 3, CN II-XII intact, strength 5/5, reflexes symmetric, sensory intact to light touch.  Psych: Behavior appropriate, engaging.  Thought processes congruent, non-tangential.  Musculoskeletal: no gross deformities.  Skin: no rashes or lesions.            Medication List           Accurate as of 5/24/19 11:59 PM. If you have any questions, ask your nurse or doctor.               CONTINUE taking these medications    acyclovir 400 MG tablet  Also known as:  ZOVIRAX  INSTRUCTIONS:  Take two (2) tablets by mouth daily for 5 days at onset of outbreak        * PROVENTIL HFA 90 mcg/actuation inhaler  INSTRUCTIONS:  Inhale 2 puffs.  Generic drug:  albuterol        * albuterol 2.5 mg /3 mL (0.083 %) nebulizer solution  Also known as:  PROVENTIL  INSTRUCTIONS:  Inhale 2.5 mg.        CLARITIN-D 24 HOUR  mg per 24 hr tablet  Generic drug:  loratadine-pseudoephedrine      "   FLOVENT  mcg/actuation inhaler  INSTRUCTIONS:  Inhale 2 puffs.  Generic drug:  fluticasone propionate        FLUoxetine 40 MG capsule  Also known as:  PROzac  INSTRUCTIONS:  Take 40 mg by mouth.        gabapentin 300 MG capsule  Also known as:  NEURONTIN  INSTRUCTIONS:  TAKE 1 CAPSULES BY MOUTH THREE TIMES A DAY.        hydrOXYzine HCl 25 MG tablet  Also known as:  ATARAX  INSTRUCTIONS:  Take 25 mg by mouth.        traZODone 50 MG tablet  Also known as:  DESYREL  INSTRUCTIONS:  take 1 tab by mouth at bedtime as needed for sleep            * This list has 2 medication(s) that are the same as other medications prescribed for you. Read the directions carefully, and ask your doctor or other care provider to review them with you.            STOP taking these medications    acamprosate 333 mg tablet  Also known as:  CAMPRAL  Stopped by:  Kalen Phillip MD     amLODIPine 5 MG tablet  Also known as:  NORVASC  Stopped by:  Kalen Phillip MD            Additional Screenings Completed Today:

## 2021-06-29 NOTE — PROGRESS NOTES
Progress Notes by Kalen Phillip MD at 11/6/2020  7:00 AM     Author: Kalen Phillip MD Service: -- Author Type: Physician    Filed: 11/6/2020 12:17 PM Encounter Date: 11/6/2020 Status: Signed    : Kalen Phillip MD (Physician)       MALE PREVENTATIVE EXAM    Assessment and Plan:     Patient has been advised of split billing requirements and indicates understanding: Yes    1. Routine medical exam    Generally the patient is doing very well.  We discussed healthy lifestyles, including adequate exercise (3-4 times a week for 20-30 minutes), and a healthy diet.  Patient should return for annual physicals, and we can also see them here as needed.       2. Class 3 severe obesity due to excess calories without serious comorbidity with body mass index (BMI) of 40.0 to 44.9 in adult (H)    He is interested in losing weight and he is interested then in seeing a dietitian or nutritionist, so I put in a referral for that today.  We can get in at some initial blood tests as well today and see what might be happening there.  We talked a bit about diet and regular exercise today as well.  I also did offer the idea of the bariatric clinic if were not making any progress on what were going to do now.    - Testosterone, Free and Total (BTEST)  - Ambulatory referral to Nutrition Services    3. Generalized anxiety disorder    He stopped taking his fluoxetine and thinks that he is actually doing quite well in regards to the anxiety.  He is also stopped taking the trazodone as he is sleeping very well also.        4. Moderate persistent asthma without complication    I did refill his albuterol that he only uses sporadically and most times around a seasonal allergy type of pattern, but and if he finds himself using it more frequently he will let me know.      - albuterol (PROVENTIL HFA) 90 mcg/actuation inhaler; Inhale 2 puffs every 6 (six) hours as needed for wheezing.  Dispense: 18 g; Refill: 3    5. Chronic fatigue    As above,  we will get a few blood test to see if there is any particular reason why he is having some fatigue, but I think if he would lose some weight or exercise regularly he would find that he has more energy and he would agree with that statement.      - Comprehensive Metabolic Panel  - Thyroid Stimulating Hormone (TSH)  - HM1(CBC and Differential)  - Vitamin D, Total (25-Hydroxy)    6. Screening, lipid    - Lipid Profile    7. Need for tetanus booster    - Td, Preservative Free (green label)        Next follow up:  No follow-ups on file.    Immunization Review  Adult Imm Review: Due today, orders placed  BMI: 42    I discussed the following with the patient:   Adult Healthy Living: Importance of regular exercise  Healthy nutrition  Weight loss referral options  Getting adequate sleep  Stress management  Use of seat belts        Subjective:   Chief Complaint: Ty Ramirez is an 30 y.o. male here for a preventative health visit.    Patient has been advised of split billing requirements and indicates understanding: Yes  HPI: Patient here today for a full physical.  He is generally doing pretty well.  His main issue today is to talk about his weight.  He has been heavy for most of his adult life, but he notices that he has tried to lose weight and what he thinks would be good things to eat and he is really had a hard time losing any weight at all.  He is wondering if he can see somebody to help him out with his diet and make some suggestions of what would be best for him to be eating.  He acknowledges not exercising as much, and he blames that a bit on Covid that with the gyms not being open as much.    He has a history of anxiety but he has gone off of his fluoxetine and his trazodone as he thinks he is doing quite well.  He actually feels better off of the medications now and is sleeping much better.    He has a history of some asthma and uses albuterol occasionally and Flovent even more rarely.  Sometimes during about  allergy time we will use the Flovent a bit but most of the time he can just get by with a few hits of albuterol a week.        Healthy Habits  Are you taking a daily aspirin? No  Do you typically exercising at least 40 min, 3-4 times per week?  Yes  Do you usually eat at least 4 servings of fruit and vegetables a day, include whole grains and fiber and avoid regularly eating high fat foods? NO  Have you had an eye exam in the past two years? Yes  Do you see a dentist twice per year? NO  Do you have any concerns regarding sleep? NO    Review of Systems:  Please see above.  The rest of the review of systems are negative for all systems.     Cancer Screening     Patient has no health maintenance due at this time          Patient Care Team:  Kalen Phillip MD as PCP - General (Family Medicine)  Kalen Phillip MD as Assigned PCP        History     Reviewed By Date/Time Sections Reviewed    Kalen Phillip MD 11/6/2020  7:13 AM Medical, Surgical, Tobacco, Alcohol, Drug Use, Sexual Activity, Candida Monreal [facility] 11/6/2020  7:01 AM Tobacco            Objective:   Vital Signs:   Visit Vitals  /80 (Patient Site: Left Arm, Patient Position: Sitting, Cuff Size: Adult Large)   Pulse 60   Ht 6' (1.829 m)   Wt (!) 313 lb 8 oz (142.2 kg)   SpO2 97%   BMI 42.52 kg/m           PHYSICAL EXAM    Well developed, well nourished, no acute distress.  HEENT: normocephalic/atraumatic, PERRLA/EOMI, TMs: Gray, normal light reflex, no nasal discharge.  Oral mucosa: no erythema/exudate  Neck: No LAD/masses/thyromegaly/bruits  Lungs: clear bilaterally  Heart: regular rate and rhythm, no murmurs/gallops/rubs.  Abdomen: Normal bowel sounds, soft, non-tender, non-distended, no masses, neg Peguero's/McBurney's, no rebound/guarding  Genital: Bilaterally descended testes, no masses, non-tender, no hernia.  No urethral discharge or erythema.  No lesions, normal phallus.  Lymphatics: no supraclavicular/axillary/epitrochlear/inguinal LAD. No  edema.  Neuro: A&O x 3, CN II-XII intact, strength 5/5, reflexes symmetric, sensory intact to light touch.  Psych: Behavior appropriate, engaging.  Thought processes congruent, non-tangential.  Musculoskeletal: no gross deformities.  Skin: no rashes or lesions.            Medication List          Accurate as of November 6, 2020 12:16 PM. If you have any questions, ask your nurse or doctor.            CONTINUE taking these medications    acyclovir 400 MG tablet  Also known as: ZOVIRAX  INSTRUCTIONS: Take two (2) tablets by mouth daily for 5 days at onset of outbreak        * albuterol 2.5 mg /3 mL (0.083 %) nebulizer solution  Also known as: PROVENTIL  INSTRUCTIONS: Inhale 2.5 mg.        * albuterol 90 mcg/actuation inhaler  Also known as: Proventil HFA  INSTRUCTIONS: Inhale 2 puffs every 6 (six) hours as needed for wheezing.  Doctor's comments: May substitute the equivalent medication per insurance preference.        Claritin-D 24 Hour  mg per 24 hr tablet  Generic drug: loratadine-pseudoephedrine        Flovent  mcg/actuation inhaler  INSTRUCTIONS: Inhale 2 puffs.  Generic drug: fluticasone propionate        hydrOXYzine HCL 25 MG tablet  Also known as: ATARAX  INSTRUCTIONS: Take 1 tablet (25 mg total) by mouth every 8 (eight) hours as needed for anxiety.            * This list has 2 medication(s) that are the same as other medications prescribed for you. Read the directions carefully, and ask your doctor or other care provider to review them with you.            STOP taking these medications    FLUoxetine 40 MG capsule  Also known as: PROzac  Stopped by: Kalen Phillip MD     gabapentin 300 MG capsule  Also known as: NEURONTIN  Stopped by: Kalen Phillip MD     traZODone 50 MG tablet  Also known as: DESYREL  Stopped by: Kalen Phillip MD           Where to Get Your Medications      [id number] [address] AT [place] [address]   [address] [place] [address]    Phone: [phone]     albuterol 90 mcg/actuation inhaler         Additional Screenings Completed Today:

## 2021-07-14 PROBLEM — I10 ESSENTIAL HYPERTENSION: Status: RESOLVED | Noted: 2017-05-01 | Resolved: 2019-05-24

## 2021-10-16 ENCOUNTER — HEALTH MAINTENANCE LETTER (OUTPATIENT)
Age: 31
End: 2021-10-16

## 2021-11-15 ENCOUNTER — OFFICE VISIT (OUTPATIENT)
Dept: FAMILY MEDICINE | Facility: CLINIC | Age: 31
End: 2021-11-15
Payer: COMMERCIAL

## 2021-11-15 VITALS
WEIGHT: 289 LBS | SYSTOLIC BLOOD PRESSURE: 116 MMHG | HEART RATE: 68 BPM | DIASTOLIC BLOOD PRESSURE: 70 MMHG | OXYGEN SATURATION: 97 % | HEIGHT: 71 IN | BODY MASS INDEX: 40.46 KG/M2

## 2021-11-15 DIAGNOSIS — Z00.00 ROUTINE MEDICAL EXAM: Primary | ICD-10-CM

## 2021-11-15 DIAGNOSIS — E66.01 CLASS 3 SEVERE OBESITY DUE TO EXCESS CALORIES WITHOUT SERIOUS COMORBIDITY WITH BODY MASS INDEX (BMI) OF 40.0 TO 44.9 IN ADULT (H): ICD-10-CM

## 2021-11-15 DIAGNOSIS — F41.1 GENERALIZED ANXIETY DISORDER: ICD-10-CM

## 2021-11-15 DIAGNOSIS — E66.813 CLASS 3 SEVERE OBESITY DUE TO EXCESS CALORIES WITHOUT SERIOUS COMORBIDITY WITH BODY MASS INDEX (BMI) OF 40.0 TO 44.9 IN ADULT (H): ICD-10-CM

## 2021-11-15 PROCEDURE — 90686 IIV4 VACC NO PRSV 0.5 ML IM: CPT | Performed by: FAMILY MEDICINE

## 2021-11-15 PROCEDURE — 96127 BRIEF EMOTIONAL/BEHAV ASSMT: CPT | Performed by: FAMILY MEDICINE

## 2021-11-15 PROCEDURE — 99395 PREV VISIT EST AGE 18-39: CPT | Mod: 25 | Performed by: FAMILY MEDICINE

## 2021-11-15 PROCEDURE — 90471 IMMUNIZATION ADMIN: CPT | Performed by: FAMILY MEDICINE

## 2021-11-15 PROCEDURE — 99214 OFFICE O/P EST MOD 30 MIN: CPT | Mod: 25 | Performed by: FAMILY MEDICINE

## 2021-11-15 RX ORDER — ESCITALOPRAM OXALATE 10 MG/1
10 TABLET ORAL DAILY
Qty: 30 TABLET | Refills: 2 | Status: SHIPPED | OUTPATIENT
Start: 2021-11-15 | End: 2024-03-01

## 2021-11-15 ASSESSMENT — ANXIETY QUESTIONNAIRES
7. FEELING AFRAID AS IF SOMETHING AWFUL MIGHT HAPPEN: NOT AT ALL
GAD7 TOTAL SCORE: 6
1. FEELING NERVOUS, ANXIOUS, OR ON EDGE: SEVERAL DAYS
5. BEING SO RESTLESS THAT IT IS HARD TO SIT STILL: NEARLY EVERY DAY
3. WORRYING TOO MUCH ABOUT DIFFERENT THINGS: NOT AT ALL
6. BECOMING EASILY ANNOYED OR IRRITABLE: NOT AT ALL
2. NOT BEING ABLE TO STOP OR CONTROL WORRYING: NOT AT ALL
4. TROUBLE RELAXING: MORE THAN HALF THE DAYS

## 2021-11-15 ASSESSMENT — MIFFLIN-ST. JEOR: SCORE: 2301.99

## 2021-11-15 NOTE — PROGRESS NOTES
ASSESSMENT/PLAN:       (Z00.00) Routine medical exam  (primary encounter diagnosis)  Comment: Generally the patient is doing very well.  We discussed healthy lifestyles, including adequate exercise (3-4 times a week for 20-30 minutes), and a healthy diet.  Patient should return for annual physicals, and we can also see them here as needed.    (F41.1) Generalized anxiety disorder  Comment: I did give him some Lexapro that he can use 10 mg daily. We talked about some of the different medications for this but he seems to think that a daily medication would work out well for him. He will continue to consider counseling as well and maintain good overall health with good diet, exercise daily, and getting adequate amounts of sleep. We can follow-up with him in about 4 to 6 weeks to ensure that he is doing well on the medication.      Plan: escitalopram (LEXAPRO) 10 MG tablet        (E66.01,  Z68.41) Class 3 severe obesity due to excess calories without serious comorbidity with body mass index (BMI) of 40.0 to 44.9 in adult (H)  Comment: We did about weight loss as a general concept today including the use of good healthy diets and decreasing carb intake, as well as daily exercise to try to help him to lose weight.        Patient has been advised of split billing requirements and indicates understanding: Yes  COUNSELING:   Reviewed preventive health counseling, as reflected in patient instructions       Regular exercise       Healthy diet/nutrition    Estimated body mass index is 42.52 kg/m  as calculated from the following:    Height as of 11/6/20: 1.829 m (6').    Weight as of 11/6/20: 142.2 kg (313 lb 8 oz).     Weight management plan: Discussed healthy diet and exercise guidelines    He reports that he has never smoked. He has never used smokeless tobacco.          SUBJECTIVE:   CC: Ty Ramirez is an 31 year old male who presents for preventative health visit.     Patient is here today for physical. He would like  Medical Assistant Note:  Luci Londono presents today for lab draw.    Patient seen by provider today: No.   present during visit today: Not Applicable.    Concerns: No Concerns.    Procedure:  Lab draw site: LAC, Needle type: Bf, Gauge: 21. Gauze and coban applied    Post Assessment:  Labs drawn without difficulty: Yes.    Discharge Plan:  Departure Mode: Ambulatory.    Face to Face Time: 5.    Emerald Pascual CMA           to talk about anxiety. He has noted some increasing anxiety over the last several years actually but it seems to be getting worse. It affects his daily living and ability to get sleep. He just really ruminates about a lot of things and is quite anxious all the time.    Also knows that he needs to lose weight but not really sure how to do that. He seems to know some of the things that he needs to do but is finding it hard to do that in the time of Covid.        Patient has been advised of split billing requirements and indicates understanding: Yes  Healthy Habits:     Getting at least 3 servings of Calcium per day:  Yes    Bi-annual eye exam:  Yes    Dental care twice a year:  NO    Sleep apnea or symptoms of sleep apnea:  Daytime drowsiness    Diet:  Regular (no restrictions)    Frequency of exercise:  4-5 days/week    Duration of exercise:  30-45 minutes    Taking medications regularly:  Yes    Medication side effects:  None    PHQ-2 Total Score: 0    Additional concerns today:  Yes              Today's PHQ-2 Score:   PHQ-2 ( 1999 Pfizer) 11/15/2021   Q1: Little interest or pleasure in doing things 0   Q2: Feeling down, depressed or hopeless 0   PHQ-2 Score 0   Q1: Little interest or pleasure in doing things Not at all   Q2: Feeling down, depressed or hopeless Not at all   PHQ-2 Score 0       Abuse: Current or Past(Physical, Sexual or Emotional)- No  Do you feel safe in your environment? Yes        Social History     Tobacco Use     Smoking status: Never Smoker     Smokeless tobacco: Never Used   Substance Use Topics     Alcohol use: Not Currently     Comment: Alcoholic Drinks/day: sober since 10/17/2017         Alcohol Use 11/15/2021   Prescreen: >3 drinks/day or >7 drinks/week? Not Applicable       Last PSA: No results found for: PSA    Reviewed orders with patient. Reviewed health maintenance and updated orders accordingly - Yes  Labs reviewed in EPIC  BP Readings from Last 3 Encounters:   11/15/21 116/70    11/06/20 124/80   09/23/19 100/70    Wt Readings from Last 3 Encounters:   11/15/21 131.1 kg (289 lb)   11/06/20 142.2 kg (313 lb 8 oz)   09/23/19 130.7 kg (288 lb 3.2 oz)                  Patient Active Problem List   Diagnosis     Moderate persistent asthma     Allergic rhinitis     Obstructive sleep apnea syndrome     Generalized anxiety disorder     Class 3 severe obesity due to excess calories without serious comorbidity with body mass index (BMI) of 40.0 to 44.9 in adult (H)     Past Surgical History:   Procedure Laterality Date     TONSILLECTOMY         Social History     Tobacco Use     Smoking status: Never Smoker     Smokeless tobacco: Never Used   Substance Use Topics     Alcohol use: Not Currently     Comment: Alcoholic Drinks/day: sober since 10/17/2017     Family History   Problem Relation Age of Onset     Hypertension Mother      Snoring Mother      Hypertension Father      Hypertension Maternal Aunt      Diabetes Maternal Aunt      Hypertension Maternal Uncle      Diabetes Maternal Uncle      Hypertension Maternal Grandmother      Heart Disease Maternal Grandfather      Hypertension Maternal Grandfather          Current Outpatient Medications   Medication Sig Dispense Refill     albuterol (PROVENTIL HFA) 90 mcg/actuation inhaler [ALBUTEROL (PROVENTIL HFA) 90 MCG/ACTUATION INHALER] Inhale 2 puffs every 6 (six) hours as needed for wheezing. 18 g 3     escitalopram (LEXAPRO) 10 MG tablet Take 1 tablet (10 mg) by mouth daily 30 tablet 2     fluticasone propionate (FLOVENT HFA) 220 mcg/actuation inhaler [FLUTICASONE PROPIONATE (FLOVENT HFA) 220 MCG/ACTUATION INHALER] Inhale 2 puffs.       hydrOXYzine HCl (ATARAX) 25 MG tablet [HYDROXYZINE HCL (ATARAX) 25 MG TABLET] Take 1 tablet (25 mg total) by mouth every 8 (eight) hours as needed for anxiety. 90 tablet 5     loratadine-pseudoephedrine (CLARITIN-D 24 HOUR)  mg per 24 hr tablet [LORATADINE-PSEUDOEPHEDRINE (CLARITIN-D 24 HOUR)  MG PER 24 HR  TABLET]        No Known Allergies    Reviewed and updated as needed this visit by clinical staff                Reviewed and updated as needed this visit by Provider               Past Medical History:   Diagnosis Date     Substance abuse (H)     alcohol      Past Surgical History:   Procedure Laterality Date     TONSILLECTOMY         Review of Systems  CONSTITUTIONAL: NEGATIVE for fever, chills, change in weight  INTEGUMENTARY/SKIN: NEGATIVE for worrisome rashes, moles or lesions  EYES: NEGATIVE for vision changes or irritation  ENT: NEGATIVE for ear, mouth and throat problems  RESP: NEGATIVE for significant cough or SOB  CV: NEGATIVE for chest pain, palpitations or peripheral edema  GI: NEGATIVE for nausea, abdominal pain, heartburn, or change in bowel habits   male: negative for dysuria, hematuria, decreased urinary stream, erectile dysfunction, urethral discharge  MUSCULOSKELETAL: NEGATIVE for significant arthralgias or myalgia  NEURO: NEGATIVE for weakness, dizziness or paresthesias  PSYCHIATRIC: NEGATIVE for changes in mood or affect    OBJECTIVE:   There were no vitals taken for this visit.    Physical Exam  GENERAL: healthy, alert and no distress  NECK: no adenopathy, no asymmetry, masses, or scars and thyroid normal to palpation  RESP: lungs clear to auscultation - no rales, rhonchi or wheezes  CV: regular rate and rhythm, normal S1 S2, no S3 or S4, no murmur, click or rub, no peripheral edema and peripheral pulses strong  ABDOMEN: soft, nontender, no hepatosplenomegaly, no masses and bowel sounds normal  MS: no gross musculoskeletal defects noted, no edema    Diagnostic Test Results:  Labs reviewed in Epic  No results found for any visits on 11/15/21.    Kalen Phillip MD  [facility]

## 2021-11-16 ASSESSMENT — PATIENT HEALTH QUESTIONNAIRE - PHQ9: SUM OF ALL RESPONSES TO PHQ QUESTIONS 1-9: 4

## 2022-02-14 NOTE — TELEPHONE ENCOUNTER
4-20-21  Reason for Call:  Other prescription     Detailed comments: [facility]  albuterol (PROVENTIL) 2.5 mg /3 mL (0.083 %) nebulizer solution  Instructions just say: Inhale 2.5 mg.  roxana needs to know how many times a day and how often    Phone Number Patient can be reached at: Other phone number:  [phone]    Best Time: anytime    Can we leave a detailed message on this number?: No    Call taken on 4/20/2021 at 8:14 AM by Didi Barbosa    
Clemencia at [facility] notified. No further questions.    Jennifer YOUNG CMA ([facility])    
Let's say up to [id number] prn shortness of breath.    Thanks    TS      
English

## 2022-03-28 ENCOUNTER — NURSE TRIAGE (OUTPATIENT)
Dept: NURSING | Facility: CLINIC | Age: 32
End: 2022-03-28
Payer: COMMERCIAL

## 2022-03-28 NOTE — TELEPHONE ENCOUNTER
Pt states that he took his wife's pills and his pills this am     Phoned Poison Control - Advised pt to eat breakfast and drink extra fluids, told pt that he may have an upset stomach and diarrhea. Pt may also feel dizzy       Per protocol - Call Poison Center now     Care advice given per protocol and when to call back. Pt verbalized understanding and agrees to plan of care.    Chante Gresham RN  [place] [facility] Nurse Advisor  5:56 AM 3/28/2022      COVID 19 Nurse Triage Plan/Patient Instructions    Please be aware that novel coronavirus (COVID-19) may be circulating in the community. If you develop symptoms such as fever, cough, or SOB or if you have concerns about the presence of another infection including coronavirus (COVID-19), please contact your health care provider or visit [email or web address].     Disposition/Instructions    Home care recommended. Follow home care protocol based instructions.    Thank you for taking steps to prevent the spread of this virus.  o Limit your contact with others.  o Wear a simple mask to cover your cough.  o Wash your hands well and often.    Resources    [facility]: About COVID-19: [email or web address]    CDC: What to Do If You're Sick: www.cdc.gov/coronavirus/2019-ncov/about/steps-when-sick.html    CDC: Ending Home Isolation: www.cdc.gov/coronavirus/2019-ncov/hcp/disposition-in-home-patients.html     CDC: Caring for Someone: www.cdc.gov/coronavirus/2019-ncov/if-you-are-sick/care-for-someone.html     [facility]: Interim Guidance for Hospital Discharge to Home: www.health.[facility].[place].us/diseases/coronavirus/hcp/hospdischarge.pdf    [facility] clinical trials (COVID-19 research studies): [email or web address]-clinical-trials     Below are the COVID-19 hotlines at the [place] Department of Health ([facility]). Interpreters are available.   o For health questions: Call 677-130-7574 or 1-905.905.2655 (7 a.m. to 7 p.m.)  o For questions about schools and  childcare: Call 853-648-7226 or 1-629.118.3469 (7 a.m. to 7 p.m.)                         Reason for Disposition    Took another person's prescription drug    Additional Information    Negative: [1] DOUBLE DOSE (an extra dose or lesser amount) of prescription drug AND [2] any symptoms (e.g., dizziness, nausea, pain, sleepiness)    Negative: [1] DOUBLE DOSE (an extra dose or lesser amount) of over-the-counter (OTC) drug AND [2] any symptoms (e.g., dizziness, nausea, pain, sleepiness)    Negative: MORE THAN A DOUBLE DOSE of a prescription or over-the-counter (OTC) drug    Negative: Drug overdose and triager unable to answer question    Negative: Caller requesting information unrelated to medicine    Negative: Caller requesting a prescription for Strep throat and has a positive culture result    Negative: Rash while taking a medication or within 3 days of stopping it    Negative: Immunization reaction suspected    Negative: [1] Asthma and [2] having symptoms of asthma (cough, wheezing, etc.)    Negative: [1] Influenza symptoms AND [2] anti-viral med prescription request, such as Tamiflu    Negative: [1] Symptom of illness (e.g., headache, abdominal pain, earache, vomiting) AND [2] more than mild    Protocols used: [facility]STION CALL[facility]

## 2022-04-24 DIAGNOSIS — J45.40 MODERATE PERSISTENT ASTHMA WITHOUT COMPLICATION: ICD-10-CM

## 2022-04-26 RX ORDER — ALBUTEROL SULFATE 90 UG/1
AEROSOL, METERED RESPIRATORY (INHALATION)
Qty: 18 G | Refills: 3 | Status: SHIPPED | OUTPATIENT
Start: 2022-04-26 | End: 2024-03-01

## 2022-04-26 NOTE — TELEPHONE ENCOUNTER
"Routing refill request to provider for review/approval because:  Needs ACT score    Last Written Prescription Date:  4/16/21  Last Fill Quantity: 18 g,  # refills: 3   Last office visit provider: 11/15/21    Requested Prescriptions   Pending Prescriptions Disp Refills     albuterol (PROAIR HFA/PROVENTIL HFA/VENTOLIN HFA) 108 (90 Base) MCG/ACT inhaler [Pharmacy Med Name: ALBUTEROL HFA INH (200 PUFFS) 6.7GM] 18 g 3     Sig: INHALE 2 PUFFS BY MOUTH EVERY 6 HOURS AS NEEDED FOR WHEEZING       Asthma Maintenance Inhalers - Anticholinergics Failed - 4/24/2022  8:24 PM        Failed - Asthma control assessment score within normal limits in last 6 months     Please review ACT score.           Passed - Patient is age 12 years or older        Passed - Medication is active on med list        Passed - Recent (6 mo) or future (30 days) visit within the authorizing provider's specialty     Patient had office visit in the last 6 months or has a visit in the next 30 days with authorizing provider or within the authorizing provider's specialty.  See \"Patient Info\" tab in inbasket, or \"Choose Columns\" in Meds & Orders section of the refill encounter.           Short-Acting Beta Agonist Inhalers Protocol  Failed - 4/24/2022  8:24 PM        Failed - Asthma control assessment score within normal limits in last 6 months     Please review ACT score.           Passed - Patient is age 12 or older        Passed - Medication is active on med list        Passed - Recent (6 mo) or future (30 days) visit within the authorizing provider's specialty     Patient had office visit in the last 6 months or has a visit in the next 30 days with authorizing provider or within the authorizing provider's specialty.  See \"Patient Info\" tab in inbasket, or \"Choose Columns\" in Meds & Orders section of the refill encounter.                 Edel Torrez RN 04/26/22 10:38 AM  "

## 2022-05-13 ENCOUNTER — TELEPHONE (OUTPATIENT)
Dept: FAMILY MEDICINE | Facility: CLINIC | Age: 32
End: 2022-05-13
Payer: COMMERCIAL

## 2022-05-13 NOTE — TELEPHONE ENCOUNTER
Reason for Call:  Medication or medication refill:    Do you use a [facility]?  Name of the pharmacy and phone number for the current request:  [id number] [place] [address] [address] [place] [address]  [phone]  Name of the medication requested: acyclovir (ZOVIRAX) 400 MG tablet     Other request: Patient called and stated he is having a shingles outbreak and is looking for a refill as soon as possible. Please advise     Can we leave a detailed message on this number? YES    Phone number patient can be reached at: Home number on file [phone] (home)    Best Time: any    Call taken on 5/13/2022 at 4:28 PM by Conchita Sharma

## 2022-05-17 NOTE — TELEPHONE ENCOUNTER
Not sure if someone did this while I was out.  Can we call patient and see if he still is needing this?    TS

## 2022-10-01 ENCOUNTER — HEALTH MAINTENANCE LETTER (OUTPATIENT)
Age: 32
End: 2022-10-01

## 2022-12-26 ENCOUNTER — NURSE TRIAGE (OUTPATIENT)
Dept: NURSING | Facility: CLINIC | Age: 32
End: 2022-12-26

## 2022-12-26 NOTE — TELEPHONE ENCOUNTER
Nurse Triage SBAR    Is this a 2nd Level Triage? YES, LICENSED PRACTITIONER REVIEW IS REQUIRED    Situation:   Wanting refill for acyclovir for herpes zoster flare up    Background:   Patient stated that before provider would give refill for anti viral for rash he would get off and on in the past. Symptoms started on Saturday and is now localized red area at this time. Patient state he knows its going to be that and pain in the next couple days    Assessment:   Patient with redness, starting of a rash to right side of face, lower than temple. Denies any rash or redness to eyelid and no blurred vision. Denies pain at this time. States it is itchy.    Protocol Recommended Disposition:   See in Office Today    Recommendation:   Will route message to previous clinic patient was seen in at [facility] as a FYI so they know he was requesting refill, but patient will go to [facility] in [place]. Patient did not need help finding [facility]. Patient was given instructions about not itching area if possible. Patient agrees with sending the message and going to [facility] today.    Routed to care team, but is going to [facility]    Does the patient meet one of the following criteria for ADS visit consideration? 16+ years old, with an [facility] PCP     TIP  Providers, please consider if this condition is appropriate for management at one of our Acute and Diagnostic Services sites.     If patient is a good candidate, please use dotphrase <dot>triageresponse and select Refer to ADS to document.      Prescription refill    Herpes zoster, right side of face, lower than the temple  Just localized to that area. Itches right now, no pain right at this moment   medication acyclovir    Not feverish    Started on saturday    [facility], in [place]  Reason for Disposition    Shingles rash (matches SYMPTOMS) and onset < 72 hours ago (3 days)    Additional Information    Negative: Difficult to awaken or acting confused (e.g., disoriented, slurred speech)    Negative:  Localized rash and doesn't match the SYMPTOMS of shingles    Negative: Back pain and doesn't match the SYMPTOMS of shingles    Negative: Shingles Vaccine (Recombinant Zoster Vaccine; RZV; Shingrix), questions about    Negative: Shingles rash of face and eye pain or blurred vision    Negative: Shingles rash on the eyelid or tip of the nose    Negative: Shingles rash and spots start appearing other places on body    Negative: Patient sounds very sick or weak to the triager    Negative: Shingles rash (matches SYMPTOMS) and weak immune system (e.g., HIV positive, cancer chemotherapy, chronic steroid treatment, splenectomy) and NOT taking antiviral medication    Negative: Shingles rash of face and facial weakness    Negative: Shingles rash of face or ear and earache or ringing in the ear    Negative: Fever > 100.4 F  (38.0 C)    Negative: SEVERE pain (e.g., excruciating)    Protocols used: SHINGLES (ZOSTER)-A-OH

## 2022-12-26 NOTE — TELEPHONE ENCOUNTER
Pt was originally prescribed acyclovir 05/24/2019. It was discontinued on 11/15/2021 as pt is no longer taking this med.    Pt has not been seen in clinic since 11/15/2021. Pt will need to be seen in clinic to have this medication prescribed if appropriate.    Pt was advised to go to walk-in-care today. Pt's needs can be met by [facility]. Pt can further discuss this medication at [facility] appt on 01/06/2023.    Cindi Silva RN

## 2023-02-04 ENCOUNTER — HEALTH MAINTENANCE LETTER (OUTPATIENT)
Age: 33
End: 2023-02-04

## 2024-01-08 ENCOUNTER — APPOINTMENT (OUTPATIENT)
Dept: CT IMAGING | Facility: CLINIC | Age: 34
End: 2024-01-08
Payer: COMMERCIAL

## 2024-01-08 ENCOUNTER — NURSE TRIAGE (OUTPATIENT)
Dept: NURSING | Facility: CLINIC | Age: 34
End: 2024-01-08

## 2024-01-08 ENCOUNTER — HOSPITAL ENCOUNTER (EMERGENCY)
Facility: CLINIC | Age: 34
Discharge: HOME | End: 2024-01-08
Payer: COMMERCIAL

## 2024-01-08 VITALS
DIASTOLIC BLOOD PRESSURE: 61 MMHG | HEART RATE: 88 BPM | TEMPERATURE: 98.6 F | SYSTOLIC BLOOD PRESSURE: 147 MMHG | BODY MASS INDEX: 38.09 KG/M2 | WEIGHT: 275 LBS | OXYGEN SATURATION: 97 % | RESPIRATION RATE: 18 BRPM

## 2024-01-08 DIAGNOSIS — R10.9 ABDOMINAL PAIN, UNSPECIFIED ABDOMINAL LOCATION: ICD-10-CM

## 2024-01-08 LAB
ALBUMIN SERPL BCG-MCNC: 4.9 G/DL (ref 3.5–5.2)
ALBUMIN UR-MCNC: NEGATIVE MG/DL
ALP SERPL-CCNC: 51 U/L (ref 40–150)
ALT SERPL W P-5'-P-CCNC: 18 U/L (ref 0–70)
ANION GAP SERPL CALCULATED.3IONS-SCNC: 12 MMOL/L (ref 7–15)
APPEARANCE UR: CLEAR
AST SERPL W P-5'-P-CCNC: 21 U/L (ref 0–45)
BASOPHILS # BLD AUTO: 0 10E3/UL (ref 0–0.2)
BASOPHILS NFR BLD AUTO: 0 %
BILIRUB DIRECT SERPL-MCNC: <0.2 MG/DL (ref 0–0.3)
BILIRUB SERPL-MCNC: 0.7 MG/DL
BILIRUB UR QL STRIP: NEGATIVE
BUN SERPL-MCNC: 18.4 MG/DL (ref 6–20)
CALCIUM SERPL-MCNC: 9.7 MG/DL (ref 8.6–10)
CHLORIDE SERPL-SCNC: 103 MMOL/L (ref 98–107)
COLOR UR AUTO: ABNORMAL
CREAT SERPL-MCNC: 1.22 MG/DL (ref 0.67–1.17)
CRP SERPL-MCNC: 7.32 MG/L
DEPRECATED HCO3 PLAS-SCNC: 24 MMOL/L (ref 22–29)
EGFRCR SERPLBLD CKD-EPI 2021: 81 ML/MIN/1.73M2
EOSINOPHIL # BLD AUTO: 0 10E3/UL (ref 0–0.7)
EOSINOPHIL NFR BLD AUTO: 0 %
ERYTHROCYTE [DISTWIDTH] IN BLOOD BY AUTOMATED COUNT: 12.5 % (ref 10–15)
FLUAV RNA SPEC QL NAA+PROBE: NEGATIVE
FLUBV RNA RESP QL NAA+PROBE: NEGATIVE
GLUCOSE SERPL-MCNC: 97 MG/DL (ref 70–99)
GLUCOSE UR STRIP-MCNC: NEGATIVE MG/DL
HCT VFR BLD AUTO: 43.7 % (ref 40–53)
HGB BLD-MCNC: 14.6 G/DL (ref 13.3–17.7)
HGB UR QL STRIP: NEGATIVE
IMM GRANULOCYTES # BLD: 0 10E3/UL
IMM GRANULOCYTES NFR BLD: 0 %
KETONES UR STRIP-MCNC: NEGATIVE MG/DL
LEUKOCYTE ESTERASE UR QL STRIP: NEGATIVE
LIPASE SERPL-CCNC: 17 U/L (ref 13–60)
LYMPHOCYTES # BLD AUTO: 0.5 10E3/UL (ref 0.8–5.3)
LYMPHOCYTES NFR BLD AUTO: 5 %
MCH RBC QN AUTO: 28.3 PG (ref 26.5–33)
MCHC RBC AUTO-ENTMCNC: 33.4 G/DL (ref 31.5–36.5)
MCV RBC AUTO: 85 FL (ref 78–100)
MONOCYTES # BLD AUTO: 0.4 10E3/UL (ref 0–1.3)
MONOCYTES NFR BLD AUTO: 4 %
MUCOUS THREADS #/AREA URNS LPF: PRESENT /LPF
NEUTROPHILS # BLD AUTO: 8.9 10E3/UL (ref 1.6–8.3)
NEUTROPHILS NFR BLD AUTO: 91 %
NITRATE UR QL: NEGATIVE
NRBC # BLD AUTO: 0 10E3/UL
NRBC BLD AUTO-RTO: 0 /100
PH UR STRIP: 5.5 [PH] (ref 5–7)
PLATELET # BLD AUTO: 153 10E3/UL (ref 150–450)
POTASSIUM SERPL-SCNC: 4.3 MMOL/L (ref 3.4–5.3)
PROT SERPL-MCNC: 7.8 G/DL (ref 6.4–8.3)
RBC # BLD AUTO: 5.15 10E6/UL (ref 4.4–5.9)
RBC URINE: 1 /HPF
RSV RNA SPEC NAA+PROBE: NEGATIVE
SARS-COV-2 RNA RESP QL NAA+PROBE: NEGATIVE
SODIUM SERPL-SCNC: 139 MMOL/L (ref 135–145)
SP GR UR STRIP: 1.01 (ref 1–1.03)
SQUAMOUS EPITHELIAL: <1 /HPF
UROBILINOGEN UR STRIP-MCNC: <2 MG/DL
WBC # BLD AUTO: 9.9 10E3/UL (ref 4–11)
WBC URINE: <1 /HPF

## 2024-01-08 PROCEDURE — 99285 EMERGENCY DEPT VISIT HI MDM: CPT | Mod: 25

## 2024-01-08 PROCEDURE — 81001 URINALYSIS AUTO W/SCOPE: CPT | Performed by: EMERGENCY MEDICINE

## 2024-01-08 PROCEDURE — 80053 COMPREHEN METABOLIC PANEL: CPT | Performed by: EMERGENCY MEDICINE

## 2024-01-08 PROCEDURE — 250N000013 HC RX MED GY IP 250 OP 250 PS 637

## 2024-01-08 PROCEDURE — 83690 ASSAY OF LIPASE: CPT | Performed by: EMERGENCY MEDICINE

## 2024-01-08 PROCEDURE — 250N000011 HC RX IP 250 OP 636

## 2024-01-08 PROCEDURE — 258N000003 HC RX IP 258 OP 636

## 2024-01-08 PROCEDURE — 36415 COLL VENOUS BLD VENIPUNCTURE: CPT | Performed by: EMERGENCY MEDICINE

## 2024-01-08 PROCEDURE — 96375 TX/PRO/DX INJ NEW DRUG ADDON: CPT

## 2024-01-08 PROCEDURE — 82248 BILIRUBIN DIRECT: CPT | Performed by: EMERGENCY MEDICINE

## 2024-01-08 PROCEDURE — 250N000011 HC RX IP 250 OP 636: Performed by: EMERGENCY MEDICINE

## 2024-01-08 PROCEDURE — 74177 CT ABD & PELVIS W/CONTRAST: CPT

## 2024-01-08 PROCEDURE — 96361 HYDRATE IV INFUSION ADD-ON: CPT

## 2024-01-08 PROCEDURE — 87637 SARSCOV2&INF A&B&RSV AMP PRB: CPT

## 2024-01-08 PROCEDURE — 85004 AUTOMATED DIFF WBC COUNT: CPT | Performed by: EMERGENCY MEDICINE

## 2024-01-08 PROCEDURE — 86140 C-REACTIVE PROTEIN: CPT | Performed by: EMERGENCY MEDICINE

## 2024-01-08 PROCEDURE — 96374 THER/PROPH/DIAG INJ IV PUSH: CPT | Mod: 59

## 2024-01-08 RX ORDER — ONDANSETRON 2 MG/ML
4 INJECTION INTRAMUSCULAR; INTRAVENOUS ONCE
Status: COMPLETED | OUTPATIENT
Start: 2024-01-08 | End: 2024-01-08

## 2024-01-08 RX ORDER — HYDROMORPHONE HYDROCHLORIDE 1 MG/ML
0.5 INJECTION, SOLUTION INTRAMUSCULAR; INTRAVENOUS; SUBCUTANEOUS ONCE
Status: DISCONTINUED | OUTPATIENT
Start: 2024-01-08 | End: 2024-01-08

## 2024-01-08 RX ORDER — OXYCODONE HYDROCHLORIDE 5 MG/1
5 TABLET ORAL ONCE
Status: COMPLETED | OUTPATIENT
Start: 2024-01-08 | End: 2024-01-08

## 2024-01-08 RX ORDER — IOPAMIDOL 755 MG/ML
90 INJECTION, SOLUTION INTRAVASCULAR ONCE
Status: COMPLETED | OUTPATIENT
Start: 2024-01-08 | End: 2024-01-08

## 2024-01-08 RX ADMIN — FAMOTIDINE 20 MG: 10 INJECTION, SOLUTION INTRAVENOUS at 18:13

## 2024-01-08 RX ADMIN — SODIUM CHLORIDE 1000 ML: 9 INJECTION, SOLUTION INTRAVENOUS at 18:13

## 2024-01-08 RX ADMIN — OXYCODONE HYDROCHLORIDE 5 MG: 5 TABLET ORAL at 20:07

## 2024-01-08 RX ADMIN — IOPAMIDOL 90 ML: 755 INJECTION, SOLUTION INTRAVENOUS at 18:44

## 2024-01-08 RX ADMIN — ONDANSETRON 4 MG: 2 INJECTION INTRAMUSCULAR; INTRAVENOUS at 18:13

## 2024-01-08 NOTE — ED PROVIDER NOTES
EMERGENCY DEPARTMENT ENCOUNTER      NAME: Ty Ramirez  AGE: 33 year old male  YOB: 1990  MRN: [id number]  EVALUATION DATE & TIME: No admission date for patient encounter.    PCP: No Ref-Primary, Physician    ED PROVIDER: Darcie Hunt PA-C      Chief Complaint   Patient presents with    Abdominal Pain     FINAL IMPRESSION:  1. Abdominal pain, unspecified abdominal location      ED COURSE & MEDICAL DECISION MAKING:    Pertinent Labs & Imaging studies reviewed. (See chart for details)  33 year old male presents to the Emergency Department for evaluation of vomiting, diarrhea, nausea and right lower abdominal pain.  Patient reports that his pain takes his breath away.  No chest pain or shortness of breath.  Eating and drinking well.  Vital signs reviewed patient is hypertensive most likely secondary due to pain.  Afebrile.  On exam, patient is tender to palpation in the right lower quadrant.  Remainder of exam is unremarkable.  Pulses are 2+ bilaterally.  Cardiac and pulm exams unremarkable. Exam is otherwise unremarkable.    Differential diagnosis includes COVID, influenza, RSV, colitis, GERD, gastritis, small bowel obstruction, appendicitis, cholecystitis, GI illness, diverticulitis.  UTI, kidney stone, pyelonephritis, hydronephrosis was considered.  CBC shows no white blood cell elevation.  Hemoglobin is stable.  Creatinine is slightly elevated at 1.22.  Hepatic and lipase is reassuring.  Influenza, COVID, RSV was negative.  UA showed no nitrates or leukocytes. CRP was slightly elevated at 7.32.  CT of the abdomen shows no acute Inflammatory process in the abdomen or pelvis. Received a dose of Pepcid, Zofran and fluids. Patient received oxycodone for his pain which improved his symptoms.  Patient did not have a bowel movement or an episode of vomiting while in the emergency room.  Patient was educated on his imaging and lab results. Patient was prescribed a PPI.  Patient will be discharged  home. He would like to be discharged home and will attempt to eat and drink at home.  Patient will return to the ED if new symptoms develop or symptoms worsen.  Patient will follow-up with his primary care doctor to discuss his ED visit.  Patient agrees with plan.  All questions answered.      ED COURSE:   5:40 PM I saw the patient.   7:53 PM I checked on the patient.  Patient is resting comfortably.  Patient was educated on his imaging and lab results.   8:30 PM Patient will be discharged.Symptoms improved. All questions answered. Patient agrees with plan.    At the conclusion of the encounter I discussed the results of all of the tests and the disposition. The questions were answered. The patient or family acknowledged understanding and was agreeable with the care plan.     0 minutes of critical care time       Additional Documentation    History:  Supplemental history from: Documented in chart  External Record(s) reviewed: Documented in chart    Work Up:  Chart documentation includes differential considered and any EKGs or imaging interpreted by provider.  In additional to work up documented, I considered the following work up: Documented in chart, if applicable.    External consultation:  Discussion of management with another provider: Documented in chart, if applicable    Complicating factors:  Care impacted by chronic illness: N/A  Care affected by social determinants of health: N/A    Disposition considerations: Discharge. No recommendations on prescription strength medication(s). See documentation for any additional details.      MEDICATIONS GIVEN IN THE EMERGENCY:  Medications   oxyCODONE (ROXICODONE) tablet 5 mg (has no administration in time range)   famotidine (PEPCID) injection 20 mg (20 mg Intravenous $Given 1/8/24 1813)   sodium chloride 0.9% BOLUS 1,000 mL (1,000 mLs Intravenous $New Bag 1/8/24 1813)   ondansetron (ZOFRAN) injection 4 mg (4 mg Intravenous $Given 1/8/24 1813)   iopamidol (ISOVUE-370)  "solution 90 mL (90 mLs Intravenous $Given 1/8/24 0578)       NEW PRESCRIPTIONS STARTED AT TODAY'S ER VISIT  New Prescriptions    OMEPRAZOLE (PRILOSEC) 20 MG DR CAPSULE    Take 1 capsule (20 mg) by mouth daily for 10 days          =================================================================    HPI    Patient information was obtained from: Patient     Use of : N/A       Ty Ramirez is a 33 year old male with a pertinent history of asthma who presents to this ED by private car for evaluation of abdominal pain.    The patient reports vomiting x3 and diarrhea x3 since 2:00 PM today. The emesis was orange/yellow in color. His bowel movements were dark brown with a few black spots. No blood. There was nausea today that went away at 4 PM. There is a sharp pain on the lower right abdomen that radiates to his right side. Pain takes his breath away. No shortness of breath. The abdominal pain began in \"waves\" but is now constant. His abdominal pain is worse with palpation. Of note, he has not been taking liquids today. Not on any hormonal therapy.     He denies dysuria, cough, fever, chills, congestion, chest pain, pain with bowel movements or any other complaints at this time.      REVIEW OF SYSTEMS   As per HPI    PAST MEDICAL HISTORY:  Past Medical History:   Diagnosis Date    Substance abuse (H)     alcohol       PAST SURGICAL HISTORY:  Past Surgical History:   Procedure Laterality Date    TONSILLECTOMY         CURRENT MEDICATIONS:    omeprazole (PRILOSEC) 20 MG DR capsule  albuterol (PROAIR HFA/PROVENTIL HFA/VENTOLIN HFA) 108 (90 Base) MCG/ACT inhaler  escitalopram (LEXAPRO) 10 MG tablet  fluticasone propionate (FLOVENT HFA) 220 mcg/actuation inhaler  hydrOXYzine HCl (ATARAX) 25 MG tablet  loratadine-pseudoephedrine (CLARITIN-D 24 HOUR)  mg per 24 hr tablet  valACYclovir (VALTREX) 1000 mg tablet        ALLERGIES:  No Known Allergies    FAMILY HISTORY:  Family History   Problem Relation Age of " Onset    Hypertension Mother     Snoring Mother     Hypertension Father     Hypertension Maternal Aunt     Diabetes Maternal Aunt     Hypertension Maternal Uncle     Diabetes Maternal Uncle     Hypertension Maternal Grandmother     Heart Disease Maternal Grandfather     Hypertension Maternal Grandfather        SOCIAL HISTORY:   Social History     Socioeconomic History    Marital status: Single   Tobacco Use    Smoking status: Never    Smokeless tobacco: Never   Substance and Sexual Activity    Alcohol use: Not Currently     Comment: Alcoholic Drinks/day: sober since 10/17/2017    Drug use: Never    Sexual activity: Yes     Partners: Female       VITALS:  BP (!) 147/79   Pulse 91   Temp 98.6  F (37  C) (Temporal)   Resp 18   Wt 124.7 kg (275 lb)   SpO2 100%   BMI 38.09 kg/m      PHYSICAL EXAM    Physical Exam  Vitals and nursing note reviewed.   Constitutional:       Appearance: Normal appearance.   HENT:      Head: Atraumatic.      Right Ear: External ear normal.      Left Ear: External ear normal.      Nose: Nose normal.      Mouth/Throat:      Mouth: Mucous membranes are moist.   Eyes:      Conjunctiva/sclera: Conjunctivae normal.      Pupils: Pupils are equal, round, and reactive to light.   Cardiovascular:      Rate and Rhythm: Normal rate and regular rhythm.      Pulses: Normal pulses.      Heart sounds: Normal heart sounds. No murmur heard.     No friction rub. No gallop.   Pulmonary:      Effort: Pulmonary effort is normal.      Breath sounds: Normal breath sounds. No wheezing or rales.   Abdominal:      Tenderness: There is abdominal tenderness in the right lower quadrant. There is no right CVA tenderness, left CVA tenderness, guarding or rebound.   Musculoskeletal:      Cervical back: Normal range of motion.   Skin:     General: Skin is dry.   Neurological:      Mental Status: He is alert. Mental status is at baseline.   Psychiatric:         Mood and Affect: Mood normal.         Thought Content:  Thought content normal.          LAB:  All pertinent labs reviewed and interpreted.  Labs Ordered and Resulted from Time of ED Arrival to Time of ED Departure   BASIC METABOLIC PANEL - Abnormal       Result Value    Sodium 139      Potassium 4.3      Chloride 103      Carbon Dioxide (CO2) 24      Anion Gap 12      Urea Nitrogen 18.4      Creatinine 1.22 (*)     GFR Estimate 81      Calcium 9.7      Glucose 97     CRP INFLAMMATION - Abnormal    CRP Inflammation 7.32 (*)    ROUTINE UA WITH MICROSCOPIC REFLEX TO CULTURE - Abnormal    Color Urine Light Yellow      Appearance Urine Clear      Glucose Urine Negative      Bilirubin Urine Negative      Ketones Urine Negative      Specific Gravity Urine 1.015      Blood Urine Negative      pH Urine 5.5      Protein Albumin Urine Negative      Urobilinogen Urine <2.0      Nitrite Urine Negative      Leukocyte Esterase Urine Negative      Mucus Urine Present (*)     RBC Urine 1      WBC Urine <1      Squamous Epithelials Urine <1     CBC WITH PLATELETS AND DIFFERENTIAL - Abnormal    WBC Count 9.9      RBC Count 5.15      Hemoglobin 14.6      Hematocrit 43.7      MCV 85      MCH 28.3      MCHC 33.4      RDW 12.5      Platelet Count 153      % Neutrophils 91      % Lymphocytes 5      % Monocytes 4      % Eosinophils 0      % Basophils 0      % Immature Granulocytes 0      NRBCs per 100 WBC 0      Absolute Neutrophils 8.9 (*)     Absolute Lymphocytes 0.5 (*)     Absolute Monocytes 0.4      Absolute Eosinophils 0.0      Absolute Basophils 0.0      Absolute Immature Granulocytes 0.0      Absolute NRBCs 0.0     HEPATIC FUNCTION PANEL - Normal    Protein Total 7.8      Albumin 4.9      Bilirubin Total 0.7      Alkaline Phosphatase 51      AST 21      ALT 18      Bilirubin Direct <0.20     LIPASE - Normal    Lipase 17     INFLUENZA A/B, RSV, & SARS-COV2 PCR - Normal    Influenza A PCR Negative      Influenza B PCR Negative      RSV PCR Negative      SARS CoV2 PCR Negative           RADIOLOGY:  Reviewed all pertinent imaging. Please see official radiology report.  CT Abdomen Pelvis w Contrast   Final Result   IMPRESSION:       No acute inflammatory process in the abdomen or pelvis.        I, Sneha Rubio, am serving as a scribe to document services personally performed by Darcie Hunt PA-C, based on my observation and the provider's statements to me. I, Darcie Hunt PA-C, attest that Sneha Rubio, is acting in a scribe capacity, has observed my performance of the services and has documented them in accordance with my direction.    Darcie Hunt PA-C  [facility] EMERGENCY ROOM  [facility] [facility] [id number] [phone]     Darcie Hunt PA-C  01/08/24 7327

## 2024-01-08 NOTE — Clinical Note
Ty Ramirez was seen and treated in our emergency department on 1/8/2024.  He may return to work on 01/10/2024.       If you have any questions or concerns, please don't hesitate to call.      Tutu Phillip, RN

## 2024-01-08 NOTE — ED TRIAGE NOTES
Pt is coming in tonight with ABD pain that started around 1400. At first the pain was sharp and stabbing around his umbilicus, it has moved from there to the Rt side and into flanks. No OTC medication PTA.    HX: no HX of abd surgeries or kidney stones.      Triage Assessment (Adult)       Row Name 01/08/24 6330          Triage Assessment    Airway WDL WDL        Respiratory WDL    Respiratory WDL WDL        Skin Circulation/Temperature WDL    Skin Circulation/Temperature WDL WDL        Cardiac WDL    Cardiac WDL WDL        Peripheral/Neurovascular WDL    Peripheral Neurovascular WDL WDL        Cognitive/Neuro/Behavioral WDL    Cognitive/Neuro/Behavioral WDL WDL

## 2024-01-08 NOTE — TELEPHONE ENCOUNTER
Ty reports Intense Abdominal pain that started ~2 pm today    - Nauseated - Vomited - orange color  - Diarrhea - Brownish black  - Dizzy & Lightheaded  - Pain moved to upper Rt Abdomen; Pain then moved to Lower Abdomen, worse on the Lower Rt and radiating to Lower Lt.    ER advised - will go to either [facility] or [facility] Advice reviewed    Alexia Kirk RN  [facility]      Reason for Disposition   [1] SEVERE pain (e.g., excruciating) AND [2] present > 1 hour    Additional Information   Negative: Shock suspected (e.g., cold/pale/clammy skin, too weak to stand, low BP, rapid pulse)   Negative: Difficult to awaken or acting confused (e.g., disoriented, slurred speech)   Negative: Passed out (i.e., lost consciousness, collapsed and was not responding)   Negative: Sounds like a life-threatening emergency to the triager    Protocols used: Abdominal Pain - Male-A-AH

## 2024-01-09 NOTE — DISCHARGE INSTRUCTIONS
Your CT scan was unremarkable.  Your urine did not show an infection.  You did not have a white blood cell count elevation.  I have prescribed you a proton pump inhibitor which reduces the acid in your stomach.  Please take prescribed medication as prescribed.  Return to the ED if new symptoms develop or symptoms worsen.  Continue to rest and orally hydrate.  Follow-up with her primary care doctor to discuss her ED visit.  Return to the ED if new symptoms develop or symptoms worsen.

## 2024-03-01 ENCOUNTER — OFFICE VISIT (OUTPATIENT)
Dept: FAMILY MEDICINE | Facility: CLINIC | Age: 34
End: 2024-03-01
Payer: COMMERCIAL

## 2024-03-01 VITALS
HEART RATE: 77 BPM | WEIGHT: 298 LBS | TEMPERATURE: 98 F | BODY MASS INDEX: 40.36 KG/M2 | SYSTOLIC BLOOD PRESSURE: 137 MMHG | OXYGEN SATURATION: 97 % | HEIGHT: 72 IN | DIASTOLIC BLOOD PRESSURE: 89 MMHG

## 2024-03-01 DIAGNOSIS — J45.40 MODERATE PERSISTENT ASTHMA WITHOUT COMPLICATION: ICD-10-CM

## 2024-03-01 DIAGNOSIS — R41.840 DIFFICULTY CONCENTRATING: ICD-10-CM

## 2024-03-01 DIAGNOSIS — F10.21 ALCOHOL DEPENDENCE IN REMISSION (H): ICD-10-CM

## 2024-03-01 DIAGNOSIS — Z11.4 SCREENING FOR HIV (HUMAN IMMUNODEFICIENCY VIRUS): ICD-10-CM

## 2024-03-01 DIAGNOSIS — Z11.59 NEED FOR HEPATITIS C SCREENING TEST: ICD-10-CM

## 2024-03-01 DIAGNOSIS — R10.11 RUQ ABDOMINAL PAIN: ICD-10-CM

## 2024-03-01 DIAGNOSIS — E66.01 CLASS 3 SEVERE OBESITY DUE TO EXCESS CALORIES WITHOUT SERIOUS COMORBIDITY WITH BODY MASS INDEX (BMI) OF 40.0 TO 44.9 IN ADULT (H): ICD-10-CM

## 2024-03-01 DIAGNOSIS — Z23 IMMUNIZATION DUE: ICD-10-CM

## 2024-03-01 DIAGNOSIS — E66.813 CLASS 3 SEVERE OBESITY DUE TO EXCESS CALORIES WITHOUT SERIOUS COMORBIDITY WITH BODY MASS INDEX (BMI) OF 40.0 TO 44.9 IN ADULT (H): ICD-10-CM

## 2024-03-01 DIAGNOSIS — Z00.00 ANNUAL PHYSICAL EXAM: Primary | ICD-10-CM

## 2024-03-01 PROBLEM — G47.33 OBSTRUCTIVE SLEEP APNEA SYNDROME: Status: RESOLVED | Noted: 2019-05-24 | Resolved: 2024-03-01

## 2024-03-01 PROBLEM — F41.1 GENERALIZED ANXIETY DISORDER: Status: RESOLVED | Noted: 2019-05-24 | Resolved: 2024-03-01

## 2024-03-01 LAB
BASOPHILS # BLD AUTO: 0 10E3/UL (ref 0–0.2)
BASOPHILS NFR BLD AUTO: 0 %
EOSINOPHIL # BLD AUTO: 0.2 10E3/UL (ref 0–0.7)
EOSINOPHIL NFR BLD AUTO: 2 %
ERYTHROCYTE [DISTWIDTH] IN BLOOD BY AUTOMATED COUNT: 12.6 % (ref 10–15)
HBA1C MFR BLD: 5.6 % (ref 0–5.6)
HCT VFR BLD AUTO: 42.8 % (ref 40–53)
HGB BLD-MCNC: 14.4 G/DL (ref 13.3–17.7)
IMM GRANULOCYTES # BLD: 0 10E3/UL
IMM GRANULOCYTES NFR BLD: 0 %
LYMPHOCYTES # BLD AUTO: 2.5 10E3/UL (ref 0.8–5.3)
LYMPHOCYTES NFR BLD AUTO: 35 %
MCH RBC QN AUTO: 28.7 PG (ref 26.5–33)
MCHC RBC AUTO-ENTMCNC: 33.6 G/DL (ref 31.5–36.5)
MCV RBC AUTO: 85 FL (ref 78–100)
MONOCYTES # BLD AUTO: 0.5 10E3/UL (ref 0–1.3)
MONOCYTES NFR BLD AUTO: 7 %
NEUTROPHILS # BLD AUTO: 4 10E3/UL (ref 1.6–8.3)
NEUTROPHILS NFR BLD AUTO: 56 %
PLATELET # BLD AUTO: 187 10E3/UL (ref 150–450)
RBC # BLD AUTO: 5.01 10E6/UL (ref 4.4–5.9)
WBC # BLD AUTO: 7.1 10E3/UL (ref 4–11)

## 2024-03-01 PROCEDURE — 99395 PREV VISIT EST AGE 18-39: CPT | Mod: 25 | Performed by: FAMILY MEDICINE

## 2024-03-01 PROCEDURE — 90471 IMMUNIZATION ADMIN: CPT | Performed by: FAMILY MEDICINE

## 2024-03-01 PROCEDURE — 85025 COMPLETE CBC W/AUTO DIFF WBC: CPT | Performed by: FAMILY MEDICINE

## 2024-03-01 PROCEDURE — 84443 ASSAY THYROID STIM HORMONE: CPT | Performed by: FAMILY MEDICINE

## 2024-03-01 PROCEDURE — 86803 HEPATITIS C AB TEST: CPT | Performed by: FAMILY MEDICINE

## 2024-03-01 PROCEDURE — 87389 HIV-1 AG W/HIV-1&-2 AB AG IA: CPT | Performed by: FAMILY MEDICINE

## 2024-03-01 PROCEDURE — 83036 HEMOGLOBIN GLYCOSYLATED A1C: CPT | Performed by: FAMILY MEDICINE

## 2024-03-01 PROCEDURE — 80053 COMPREHEN METABOLIC PANEL: CPT | Performed by: FAMILY MEDICINE

## 2024-03-01 PROCEDURE — 99214 OFFICE O/P EST MOD 30 MIN: CPT | Mod: 25 | Performed by: FAMILY MEDICINE

## 2024-03-01 PROCEDURE — 36415 COLL VENOUS BLD VENIPUNCTURE: CPT | Performed by: FAMILY MEDICINE

## 2024-03-01 PROCEDURE — 90677 PCV20 VACCINE IM: CPT | Performed by: FAMILY MEDICINE

## 2024-03-01 PROCEDURE — 80061 LIPID PANEL: CPT | Performed by: FAMILY MEDICINE

## 2024-03-01 RX ORDER — CHLORHEXIDINE GLUCONATE ORAL RINSE 1.2 MG/ML
SOLUTION DENTAL
COMMUNITY
Start: 2023-05-13 | End: 2024-03-01

## 2024-03-01 RX ORDER — PHENTERMINE HYDROCHLORIDE 37.5 MG/1
37.5 TABLET ORAL
Qty: 30 TABLET | Refills: 2 | Status: SHIPPED | OUTPATIENT
Start: 2024-03-01

## 2024-03-01 RX ORDER — FLUTICASONE PROPIONATE 100 UG/1
POWDER, METERED RESPIRATORY (INHALATION)
COMMUNITY
Start: 2023-09-05 | End: 2024-03-01

## 2024-03-01 RX ORDER — AMOXICILLIN 500 MG/1
500 CAPSULE ORAL
COMMUNITY
Start: 2023-05-13 | End: 2024-03-01

## 2024-03-01 RX ORDER — IBUPROFEN 800 MG/1
800 TABLET, FILM COATED ORAL EVERY 8 HOURS PRN
COMMUNITY
Start: 2023-05-13 | End: 2024-03-01

## 2024-03-01 RX ORDER — ALBUTEROL SULFATE 0.83 MG/ML
SOLUTION RESPIRATORY (INHALATION)
COMMUNITY
Start: 2023-04-11

## 2024-03-01 RX ORDER — FLUTICASONE PROPIONATE 100 UG/1
POWDER, METERED RESPIRATORY (INHALATION)
Status: CANCELLED | OUTPATIENT
Start: 2024-03-01

## 2024-03-01 RX ORDER — PHENTERMINE HYDROCHLORIDE 37.5 MG/1
TABLET ORAL
COMMUNITY
End: 2024-03-01

## 2024-03-01 RX ORDER — FLUTICASONE PROPIONATE AND SALMETEROL 100; 50 UG/1; UG/1
1 POWDER RESPIRATORY (INHALATION) EVERY 12 HOURS
Qty: 60 EACH | Refills: 11 | Status: SHIPPED | OUTPATIENT
Start: 2024-03-01

## 2024-03-01 RX ORDER — ALBUTEROL SULFATE 90 UG/1
2 AEROSOL, METERED RESPIRATORY (INHALATION) EVERY 6 HOURS PRN
Qty: 18 G | Refills: 5 | Status: SHIPPED | OUTPATIENT
Start: 2024-03-01

## 2024-03-01 SDOH — HEALTH STABILITY: PHYSICAL HEALTH: ON AVERAGE, HOW MANY DAYS PER WEEK DO YOU ENGAGE IN MODERATE TO STRENUOUS EXERCISE (LIKE A BRISK WALK)?: 3 DAYS

## 2024-03-01 SDOH — HEALTH STABILITY: PHYSICAL HEALTH: ON AVERAGE, HOW MANY MINUTES DO YOU ENGAGE IN EXERCISE AT THIS LEVEL?: 40 MIN

## 2024-03-01 ASSESSMENT — ASTHMA QUESTIONNAIRES
QUESTION_4 LAST FOUR WEEKS HOW OFTEN HAVE YOU USED YOUR RESCUE INHALER OR NEBULIZER MEDICATION (SUCH AS ALBUTEROL): ONCE A WEEK OR LESS
ACT_TOTALSCORE: 20
QUESTION_2 LAST FOUR WEEKS HOW OFTEN HAVE YOU HAD SHORTNESS OF BREATH: ONCE OR TWICE A WEEK
QUESTION_5 LAST FOUR WEEKS HOW WOULD YOU RATE YOUR ASTHMA CONTROL: WELL CONTROLLED
ACT_TOTALSCORE: 20
QUESTION_3 LAST FOUR WEEKS HOW OFTEN DID YOUR ASTHMA SYMPTOMS (WHEEZING, COUGHING, SHORTNESS OF BREATH, CHEST TIGHTNESS OR PAIN) WAKE YOU UP AT NIGHT OR EARLIER THAN USUAL IN THE MORNING: ONCE OR TWICE
QUESTION_1 LAST FOUR WEEKS HOW MUCH OF THE TIME DID YOUR ASTHMA KEEP YOU FROM GETTING AS MUCH DONE AT WORK, SCHOOL OR AT HOME: A LITTLE OF THE TIME

## 2024-03-01 ASSESSMENT — PAIN SCALES - GENERAL: PAINLEVEL: NO PAIN (0)

## 2024-03-01 ASSESSMENT — SOCIAL DETERMINANTS OF HEALTH (SDOH): HOW OFTEN DO YOU GET TOGETHER WITH FRIENDS OR RELATIVES?: ONCE A WEEK

## 2024-03-01 NOTE — PROGRESS NOTES
Preventive Care Visit  [facility] KATARZYNA Richardson MD, Family Medicine  Mar 1, 2024      SUBJECTIVE:   Jude is a 33 year old, presenting for the following:  Physical (Fasting, would like labs. Concerns about obesity, liver concerns, abdominal pain-history of alcoholism, has been sober for 6 years)        3/1/2024     4:18 PM   Additional Questions   Roomed by [facility], Visit Facilitator     HPI    Asthma: Diagnosed in 1st grade. Asthma gets worse in the spring. He uses Flovent Diskus 100 mcg/act . Albuterol once a month, when spring comes 3-4 times per week. He is needing a new nebulizer machine as well.    Alcohol: He has been sober for 6 years, he does occasional meetings and has a strong support group.    RUQ pain: He feels a bruise like pain in RUQ that comes and goes, felt it all the time when he was drinking.    Obesity: He has a 6 month old and wants to be healthier for his son. He goes to the gym 4-5 days per week with weights and does [employer] at home. He follows a healthy diet during the week, he falls off on weekend.    Difficulty concentrating: Patient states that he struggled focusing as a child and struggled in school as a result.  He has better working with his hands.  He has a heavy .  He is wondering if he has a diagnosis of ADHD and is interested in having testing to evaluate for ADHD.    Today's PHQ-2 Score:       3/1/2024     4:26 AM   PHQ-2 ( 1999 [employer])   Q1: Little interest or pleasure in doing things 0   Q2: Feeling down, depressed or hopeless 0   PHQ-2 Score 0   Q1: Little interest or pleasure in doing things Not at all   Q2: Feeling down, depressed or hopeless Not at all   PHQ-2 Score 0               Social History     Tobacco Use    Smoking status: Never     Passive exposure: Never    Smokeless tobacco: Never   Substance Use Topics    Alcohol use: Not Currently     Comment: Alcoholic Drinks/day: sober since 10/17/2017         3/1/2024     4:25 AM  "  Alcohol Use   Prescreen: >3 drinks/day or >7 drinks/week? Not Applicable       Last PSA: No results found for: \"PSA\"    Reviewed orders with patient. Reviewed health maintenance and updated orders accordingly - Yes      Reviewed and updated as needed this visit by clinical staff   Tobacco  Allergies  Meds   Med Hx  Surg Hx  Fam Hx  Soc Hx        Reviewed and updated as needed this visit by Provider   Tobacco     Med Hx  Surg Hx  Fam Hx  Soc Hx Sexual Activity          Review of Systems    Review of Systems  Constitutional, HEENT, cardiovascular, pulmonary, gi and gu systems are negative, except as otherwise noted.    OBJECTIVE:   /89 (BP Location: Left arm, Patient Position: Sitting, Cuff Size: Adult Large)   Pulse 77   Temp 98  F (36.7  C) (Oral)   Ht 1.816 m (5' 11.5\")   Wt 135.2 kg (298 lb)   SpO2 97%   BMI 40.98 kg/m     Estimated body mass index is 40.98 kg/m  as calculated from the following:    Height as of this encounter: 1.816 m (5' 11.5\").    Weight as of this encounter: 135.2 kg (298 lb).  Physical Exam  GENERAL: alert and no distress  EYES: Eyes grossly normal to inspection, PERRL and conjunctivae and sclerae normal  HENT: ear canals and TM's normal, nose and mouth without ulcers or lesions  NECK: no adenopathy, no asymmetry, masses, or scars  RESP: lungs clear to auscultation - no rales, rhonchi or wheezes  CV: regular rate and rhythm, normal S1 S2, no S3 or S4, no murmur, click or rub, no peripheral edema  ABDOMEN: soft, nontender, no hepatosplenomegaly, no masses and bowel sounds normal  MS: no gross musculoskeletal defects noted, no edema  SKIN: no suspicious lesions or rashes  NEURO: Normal strength and tone, mentation intact and speech normal  PSYCH: mentation appears normal, affect normal/bright        ASSESSMENT/PLAN:   Annual physical exam  Advised healthy lifestyle.  Check labs and notify with results.  - HIV Antigen Antibody Combo; Future  - Hepatitis C Screen Reflex " to HCV RNA Quant and Genotype; Future  - Comprehensive metabolic panel; Future  - Lipid Profile; Future  - TSH with free T4 reflex; Future  - CBC with Platelets & Differential; Future  - Hemoglobin A1c; Future    Alcohol dependence in remission (H)  Sober for the last 6 years.  Encourage patient continue with remission.    Moderate persistent asthma without complication  Uncontrolled in the springtime.  Will increase Flovent to Advair.  Discussed if needed we can increase Advair dose.  If still having problems we can change to Trelegy.  Refill for albuterol inhaler and nebulizer machine given today in clinic.  - fluticasone-salmeterol (ADVAIR) 100-50 MCG/ACT inhaler; Inhale 1 puff into the lungs every 12 hours  - albuterol (PROAIR HFA/PROVENTIL HFA/VENTOLIN HFA) 108 (90 Base) MCG/ACT inhaler; Inhale 2 puffs into the lungs every 6 hours as needed for wheezing  - Nebulizer and Supplies Order for DME - ONLY FOR DME    Class 3 severe obesity due to excess calories without serious comorbidity with body mass index (BMI) of 40.0 to 44.9 in adult (H)  Discussed diet and exercise.  Patient would like to retry a prescription of phentermine which she has taken in the past.  Prescription phentermine given for 3 months, discussed that this is the maximum FDA approved time for phentermine.  - phentermine (ADIPEX-P) 37.5 MG tablet; Take 1 tablet (37.5 mg) by mouth every morning (before breakfast)    RUQ abdominal pain  Check ultrasound to evaluate pain and specifically for fatty liver.  - US Abdomen Limited; Future    Difficulty concentrating  Referral to psychologist for ADHD testing.  - Adult Mental Health  Referral; Future    Immunization due  - Pneumococcal 20 Valent Conjugate (Prevnar 20)    Screening for HIV (human immunodeficiency virus)  - HIV Antigen Antibody Combo; Future    Need for hepatitis C screening test  - Hepatitis C Screen Reflex to HCV RNA Quant and Genotype; Future      Patient has been advised of  "split billing requirements and indicates understanding: Yes      Counseling  Reviewed preventive health counseling, as reflected in patient instructions       Regular exercise       Healthy diet/nutrition       Pneumococcal Vaccine       BMI  Estimated body mass index is 40.98 kg/m  as calculated from the following:    Height as of this encounter: 1.816 m (5' 11.5\").    Weight as of this encounter: 135.2 kg (298 lb).   Weight management plan: Discussed healthy diet and exercise guidelines      He reports that he has never smoked. He has never been exposed to tobacco smoke. He has never used smokeless tobacco.            Signed Electronically by: Antonio Richardson MD  "

## 2024-03-04 LAB
ALBUMIN SERPL BCG-MCNC: 4.9 G/DL (ref 3.5–5.2)
ALP SERPL-CCNC: 53 U/L (ref 40–150)
ALT SERPL W P-5'-P-CCNC: 30 U/L (ref 0–70)
ANION GAP SERPL CALCULATED.3IONS-SCNC: 10 MMOL/L (ref 7–15)
AST SERPL W P-5'-P-CCNC: 31 U/L (ref 0–45)
BILIRUB SERPL-MCNC: 0.4 MG/DL
BUN SERPL-MCNC: 15.1 MG/DL (ref 6–20)
CALCIUM SERPL-MCNC: 10 MG/DL (ref 8.6–10)
CHLORIDE SERPL-SCNC: 104 MMOL/L (ref 98–107)
CHOLEST SERPL-MCNC: 191 MG/DL
CREAT SERPL-MCNC: 1.37 MG/DL (ref 0.67–1.17)
DEPRECATED HCO3 PLAS-SCNC: 25 MMOL/L (ref 22–29)
EGFRCR SERPLBLD CKD-EPI 2021: 71 ML/MIN/1.73M2
FASTING STATUS PATIENT QL REPORTED: ABNORMAL
GLUCOSE SERPL-MCNC: 88 MG/DL (ref 70–99)
HCV AB SERPL QL IA: NONREACTIVE
HDLC SERPL-MCNC: 54 MG/DL
HIV 1+2 AB+HIV1 P24 AG SERPL QL IA: NONREACTIVE
LDLC SERPL CALC-MCNC: 127 MG/DL
NONHDLC SERPL-MCNC: 137 MG/DL
POTASSIUM SERPL-SCNC: 4.8 MMOL/L (ref 3.4–5.3)
PROT SERPL-MCNC: 7.9 G/DL (ref 6.4–8.3)
SODIUM SERPL-SCNC: 139 MMOL/L (ref 135–145)
TRIGL SERPL-MCNC: 52 MG/DL
TSH SERPL DL<=0.005 MIU/L-ACNC: 1.36 UIU/ML (ref 0.3–4.2)

## 2024-03-07 ENCOUNTER — MYC REFILL (OUTPATIENT)
Dept: FAMILY MEDICINE | Facility: CLINIC | Age: 34
End: 2024-03-07
Payer: COMMERCIAL

## 2024-03-07 ENCOUNTER — HOSPITAL ENCOUNTER (OUTPATIENT)
Dept: ULTRASOUND IMAGING | Facility: CLINIC | Age: 34
Discharge: HOME | End: 2024-03-07
Attending: FAMILY MEDICINE | Admitting: FAMILY MEDICINE
Payer: COMMERCIAL

## 2024-03-07 DIAGNOSIS — B00.9 HSV (HERPES SIMPLEX VIRUS) INFECTION: Primary | ICD-10-CM

## 2024-03-07 DIAGNOSIS — R10.11 RUQ ABDOMINAL PAIN: ICD-10-CM

## 2024-03-07 PROCEDURE — 76705 ECHO EXAM OF ABDOMEN: CPT

## 2024-03-08 RX ORDER — VALACYCLOVIR HYDROCHLORIDE 1 G/1
1000 TABLET, FILM COATED ORAL DAILY
Qty: 20 TABLET | Refills: 0 | Status: SHIPPED | OUTPATIENT
Start: 2024-03-08

## 2024-06-05 ENCOUNTER — MYC MEDICAL ADVICE (OUTPATIENT)
Dept: FAMILY MEDICINE | Facility: CLINIC | Age: 34
End: 2024-06-05
Payer: COMMERCIAL

## 2024-06-05 DIAGNOSIS — R41.840 DIFFICULTY CONCENTRATING: ICD-10-CM

## 2024-06-06 NOTE — TELEPHONE ENCOUNTER
Referral signed. Please notify patient to have the ADHD evaluation and we can review the evaluation information after he has testing done to discuss if ADHD medication would be appropriate.  He can call [phone] to schedule an appointment.    Antonio Richardson MD

## 2024-06-06 NOTE — TELEPHONE ENCOUNTER
Referral pended for review.     Please review and advise on patient MyChart message.    Georgette Lang RN  [facility]

## 2024-11-18 ENCOUNTER — VIRTUAL VISIT (OUTPATIENT)
Dept: FAMILY MEDICINE | Facility: CLINIC | Age: 34
End: 2024-11-18
Payer: COMMERCIAL

## 2024-11-18 ENCOUNTER — TELEPHONE (OUTPATIENT)
Dept: FAMILY MEDICINE | Facility: CLINIC | Age: 34
End: 2024-11-18

## 2024-11-18 DIAGNOSIS — E66.813 CLASS 3 SEVERE OBESITY DUE TO EXCESS CALORIES WITHOUT SERIOUS COMORBIDITY WITH BODY MASS INDEX (BMI) OF 40.0 TO 44.9 IN ADULT (H): Primary | ICD-10-CM

## 2024-11-18 DIAGNOSIS — E66.01 CLASS 3 SEVERE OBESITY DUE TO EXCESS CALORIES WITHOUT SERIOUS COMORBIDITY WITH BODY MASS INDEX (BMI) OF 40.0 TO 44.9 IN ADULT (H): Primary | ICD-10-CM

## 2024-11-18 PROCEDURE — 99213 OFFICE O/P EST LOW 20 MIN: CPT | Mod: 95 | Performed by: NURSE PRACTITIONER

## 2024-11-18 PROCEDURE — G2211 COMPLEX E/M VISIT ADD ON: HCPCS | Mod: 95 | Performed by: NURSE PRACTITIONER

## 2024-11-18 RX ORDER — SEMAGLUTIDE 0.5 MG/.5ML
0.5 INJECTION, SOLUTION SUBCUTANEOUS WEEKLY
Qty: 2 ML | Refills: 1 | Status: SHIPPED | OUTPATIENT
Start: 2024-11-18

## 2024-11-18 NOTE — PROGRESS NOTES
"Jude is a 34 year old who is being evaluated via a billable video visit.    How would you like to obtain your AVS? MyChart  Will anyone else be joining your video visit? No      Assessment & Plan     Class 3 severe obesity due to excess calories without serious comorbidity with body mass index (BMI) of 40.0 to 44.9 in adult (H)  **  - Semaglutide-Weight Management (WEGOVY) 0.25 MG/0.5ML pen  Dispense: 2 mL; Refill: 0  - Semaglutide-Weight Management (WEGOVY) 0.5 MG/0.5ML pen  Dispense: 2 mL; Refill: 1    No contraindication noted.  Potential side effects of Wegovy discussed.  Potential benefits of this medication discussed.  How to take this medication was discussed, medication shortages was discussed, and the prior Auth process also discussed.  If the medication is denied by prior Auth, he will contact me and see if he can get it through our compound pharmacy.  Prior labs in March were stable.   -If medication is covered, I would like him to follow back up with his PCP within 3 months for recheck, and to assess for med side effects.   -I thoroughly went over diet changes with him today.  I discussed the importance of eating more protein and fiber in his diet; for example least 30 g of protein per meal, and 25 g of fiber per day.  He verbalized understanding  -He will continue to follow PCP for chronic conditions.    The longitudinal plan of care for the diagnosis(es)/condition(s) as documented were addressed during this visit. Due to the added complexity in care, I will continue to support Jude in the subsequent management and with ongoing continuity of care.        BMI  Estimated body mass index is 40.98 kg/m  as calculated from the following:    Height as of 3/1/24: 1.816 m (5' 11.5\").    Weight as of 3/1/24: 135.2 kg (298 lb).   Weight management plan: Discussed healthy diet and exercise guidelines      Work on weight loss  Regular exercise    Subjective   Jude is a 34 year old, presenting for the " following health issues:  Weight Manegment       11/18/2024    11:39 AM   Additional Questions   Roomed by Guillermina JENNINGS     - tried phetermine and felt it did not help as much as he would like. Has been off the med and noticing his weight is up, has cravings, etc. Has been working on more protein and fiber. His wife started Wegovy and noticing a big difference in her weight. He has a one year old, and really would like to get on-top of his weight before he has more risk factors such as diabetes.   No ho medullary thyroid cancer.   No pancreatitis.     HPI                 Objective           Vitals:  No vitals were obtained today due to virtual visit.    Physical Exam   GENERAL: alert and no distress  EYES: Eyes grossly normal to inspection.  No discharge or erythema, or obvious scleral/conjunctival abnormalities.  RESP: No audible wheeze, cough, or visible cyanosis.    SKIN: Visible skin clear. No significant rash, abnormal pigmentation or lesions.  NEURO: Cranial nerves grossly intact.  Mentation and speech appropriate for age.  PSYCH: Appropriate affect, tone, and pace of words    Office Visit on 03/01/2024   Component Date Value Ref Range Status    HIV Antigen Antibody Combo 03/01/2024 Nonreactive  Nonreactive Final    Negative HIV-1/-2 antigen and antibody screening test results usually indicate the absence of HIV-1 and HIV-2 infection. However, such negative results do not rule-out acute HIV infection.  If acute HIV-1 or HIV-2 infection is suspected, detection of HIV-1 or HIV-2 RNA  is recommended.     Hepatitis C Antibody 03/01/2024 Nonreactive  Nonreactive Final    A nonreactive screening test result does not exclude the possibility of exposure to or infection with HCV. Nonreactive screening test results in individuals with prior exposure to HCV may be due to antibody levels below the limit of detection of this assay or lack of reactivity to the HCV antigens used in this assay. Patients with recent HCV infections  (<3 months from time of exposure) may have false-negative HCV antibody results due to the time needed for seroconversion (average of 8 to 9 weeks).    Sodium 03/01/2024 139  135 - 145 mmol/L Final    Reference intervals for this test were updated on 09/26/2023 to more accurately reflect our healthy population. There may be differences in the flagging of prior results with similar values performed with this method. Interpretation of those prior results can be made in the context of the updated reference intervals.     Potassium 03/01/2024 4.8  3.4 - 5.3 mmol/L Final    Carbon Dioxide (CO2) 03/01/2024 25  22 - 29 mmol/L Final    Anion Gap 03/01/2024 10  7 - 15 mmol/L Final    Urea Nitrogen 03/01/2024 15.1  6.0 - 20.0 mg/dL Final    Creatinine 03/01/2024 1.37 (H)  0.67 - 1.17 mg/dL Final    GFR Estimate 03/01/2024 71  >60 mL/min/1.73m2 Final    Calcium 03/01/2024 10.0  8.6 - 10.0 mg/dL Final    Chloride 03/01/2024 104  98 - 107 mmol/L Final    Glucose 03/01/2024 88  70 - 99 mg/dL Final    Alkaline Phosphatase 03/01/2024 53  40 - 150 U/L Final    Reference intervals for this test were updated on 11/14/2023 to more accurately reflect our healthy population. There may be differences in the flagging of prior results with similar values performed with this method. Interpretation of those prior results can be made in the context of the updated reference intervals.    AST 03/01/2024 31  0 - 45 U/L Final    Reference intervals for this test were updated on 6/12/2023 to more accurately reflect our healthy population. There may be differences in the flagging of prior results with similar values performed with this method. Interpretation of those prior results can be made in the context of the updated reference intervals.    ALT 03/01/2024 30  0 - 70 U/L Final    Reference intervals for this test were updated on 6/12/2023 to more accurately reflect our healthy population. There may be differences in the flagging of prior results  with similar values performed with this method. Interpretation of those prior results can be made in the context of the updated reference intervals.      Protein Total 03/01/2024 7.9  6.4 - 8.3 g/dL Final    Albumin 03/01/2024 4.9  3.5 - 5.2 g/dL Final    Bilirubin Total 03/01/2024 0.4  <=1.2 mg/dL Final    Cholesterol 03/01/2024 191  <200 mg/dL Final    Triglycerides 03/01/2024 52  <150 mg/dL Final    Direct Measure HDL 03/01/2024 54  >=40 mg/dL Final    LDL Cholesterol Calculated 03/01/2024 127 (H)  <=100 mg/dL Final    Non HDL Cholesterol 03/01/2024 137 (H)  <130 mg/dL Final    Patient Fasting > 8hrs? 03/01/2024 Unknown   Final    TSH 03/01/2024 1.36  0.30 - 4.20 uIU/mL Final    Hemoglobin A1C 03/01/2024 5.6  0.0 - 5.6 % Final    Normal <5.7%   Prediabetes 5.7-6.4%    Diabetes 6.5% or higher     Note: Adopted from ADA consensus guidelines.    WBC Count 03/01/2024 7.1  4.0 - 11.0 10e3/uL Final    RBC Count 03/01/2024 5.01  4.40 - 5.90 10e6/uL Final    Hemoglobin 03/01/2024 14.4  13.3 - 17.7 g/dL Final    Hematocrit 03/01/2024 42.8  40.0 - 53.0 % Final    MCV 03/01/2024 85  78 - 100 fL Final    MCH 03/01/2024 28.7  26.5 - 33.0 pg Final    MCHC 03/01/2024 33.6  31.5 - 36.5 g/dL Final    RDW 03/01/2024 12.6  10.0 - 15.0 % Final    Platelet Count 03/01/2024 187  150 - 450 10e3/uL Final    % Neutrophils 03/01/2024 56  % Final    % Lymphocytes 03/01/2024 35  % Final    % Monocytes 03/01/2024 7  % Final    % Eosinophils 03/01/2024 2  % Final    % Basophils 03/01/2024 0  % Final    % Immature Granulocytes 03/01/2024 0  % Final    Absolute Neutrophils 03/01/2024 4.0  1.6 - 8.3 10e3/uL Final    Absolute Lymphocytes 03/01/2024 2.5  0.8 - 5.3 10e3/uL Final    Absolute Monocytes 03/01/2024 0.5  0.0 - 1.3 10e3/uL Final    Absolute Eosinophils 03/01/2024 0.2  0.0 - 0.7 10e3/uL Final    Absolute Basophils 03/01/2024 0.0  0.0 - 0.2 10e3/uL Final    Absolute Immature Granulocytes 03/01/2024 0.0  <=0.4 10e3/uL Final          Video-Visit Details    Type of service:  Video Visit   Originating Location (pt. Location): Home    Distant Location (provider location):  On-site  Platform used for Video Visit: Ashli  Signed Electronically by: JOSE CARLOS Goodman CNP

## 2024-12-08 ENCOUNTER — E-VISIT (OUTPATIENT)
Dept: FAMILY MEDICINE | Facility: CLINIC | Age: 34
End: 2024-12-08
Payer: COMMERCIAL

## 2024-12-08 DIAGNOSIS — F90.9 ATTENTION DEFICIT HYPERACTIVITY DISORDER (ADHD), UNSPECIFIED ADHD TYPE: Primary | ICD-10-CM

## 2024-12-08 PROCEDURE — 99207 PR NON-BILLABLE SERV PER CHARTING: CPT | Performed by: FAMILY MEDICINE

## 2024-12-08 ASSESSMENT — PATIENT HEALTH QUESTIONNAIRE - PHQ9
10. IF YOU CHECKED OFF ANY PROBLEMS, HOW DIFFICULT HAVE THESE PROBLEMS MADE IT FOR YOU TO DO YOUR WORK, TAKE CARE OF THINGS AT HOME, OR GET ALONG WITH OTHER PEOPLE: VERY DIFFICULT
SUM OF ALL RESPONSES TO PHQ QUESTIONS 1-9: 13
SUM OF ALL RESPONSES TO PHQ QUESTIONS 1-9: 13

## 2024-12-08 ASSESSMENT — ANXIETY QUESTIONNAIRES
GAD7 TOTAL SCORE: 8
4. TROUBLE RELAXING: MORE THAN HALF THE DAYS
1. FEELING NERVOUS, ANXIOUS, OR ON EDGE: NOT AT ALL
GAD7 TOTAL SCORE: 8
IF YOU CHECKED OFF ANY PROBLEMS ON THIS QUESTIONNAIRE, HOW DIFFICULT HAVE THESE PROBLEMS MADE IT FOR YOU TO DO YOUR WORK, TAKE CARE OF THINGS AT HOME, OR GET ALONG WITH OTHER PEOPLE: SOMEWHAT DIFFICULT
5. BEING SO RESTLESS THAT IT IS HARD TO SIT STILL: NEARLY EVERY DAY
GAD7 TOTAL SCORE: 8
2. NOT BEING ABLE TO STOP OR CONTROL WORRYING: SEVERAL DAYS
8. IF YOU CHECKED OFF ANY PROBLEMS, HOW DIFFICULT HAVE THESE MADE IT FOR YOU TO DO YOUR WORK, TAKE CARE OF THINGS AT HOME, OR GET ALONG WITH OTHER PEOPLE?: SOMEWHAT DIFFICULT
3. WORRYING TOO MUCH ABOUT DIFFERENT THINGS: SEVERAL DAYS
6. BECOMING EASILY ANNOYED OR IRRITABLE: SEVERAL DAYS
7. FEELING AFRAID AS IF SOMETHING AWFUL MIGHT HAPPEN: NOT AT ALL
7. FEELING AFRAID AS IF SOMETHING AWFUL MIGHT HAPPEN: NOT AT ALL

## 2024-12-09 NOTE — PATIENT INSTRUCTIONS
Thank you for choosing us for your care. I think an in-clinic or virtual visit would be the best next step based on your symptoms. Please schedule a clinic appointment; you won t be charged for this eVisit.      You can schedule an appointment by clicking here in [employer], or call [phone].

## 2024-12-11 ASSESSMENT — ASTHMA QUESTIONNAIRES
QUESTION_3 LAST FOUR WEEKS HOW OFTEN DID YOUR ASTHMA SYMPTOMS (WHEEZING, COUGHING, SHORTNESS OF BREATH, CHEST TIGHTNESS OR PAIN) WAKE YOU UP AT NIGHT OR EARLIER THAN USUAL IN THE MORNING: NOT AT ALL
QUESTION_2 LAST FOUR WEEKS HOW OFTEN HAVE YOU HAD SHORTNESS OF BREATH: NOT AT ALL
QUESTION_4 LAST FOUR WEEKS HOW OFTEN HAVE YOU USED YOUR RESCUE INHALER OR NEBULIZER MEDICATION (SUCH AS ALBUTEROL): NOT AT ALL
QUESTION_1 LAST FOUR WEEKS HOW MUCH OF THE TIME DID YOUR ASTHMA KEEP YOU FROM GETTING AS MUCH DONE AT WORK, SCHOOL OR AT HOME: NONE OF THE TIME
ACT_TOTALSCORE: 24
ACT_TOTALSCORE: 24
QUESTION_5 LAST FOUR WEEKS HOW WOULD YOU RATE YOUR ASTHMA CONTROL: WELL CONTROLLED

## 2024-12-12 ENCOUNTER — VIRTUAL VISIT (OUTPATIENT)
Dept: FAMILY MEDICINE | Facility: CLINIC | Age: 34
End: 2024-12-12
Payer: COMMERCIAL

## 2024-12-12 DIAGNOSIS — F90.9 ATTENTION DEFICIT HYPERACTIVITY DISORDER (ADHD), UNSPECIFIED ADHD TYPE: Primary | ICD-10-CM

## 2024-12-12 RX ORDER — BUPROPION HYDROCHLORIDE 300 MG/1
300 TABLET ORAL EVERY MORNING
Qty: 30 TABLET | Refills: 1 | Status: SHIPPED | OUTPATIENT
Start: 2025-01-02 | End: 2025-03-03

## 2024-12-12 RX ORDER — BUPROPION HYDROCHLORIDE 150 MG/1
150 TABLET ORAL EVERY MORNING
Qty: 30 TABLET | Refills: 0 | Status: SHIPPED | OUTPATIENT
Start: 2024-12-12 | End: 2025-01-11

## 2024-12-12 NOTE — PROGRESS NOTES
Jude is a 34 year old who is being evaluated via a billable video visit.    How would you like to obtain your AVS? MyChart  If the video visit is dropped, the invitation should be resent by: Text to cell phone: [phone]  Will anyone else be joining your video visit? No      Assessment & Plan     Attention deficit hyperactivity disorder (ADHD), unspecified ADHD type  I am hesitant to start stimulant without new formal diagnosis of ADHD. I think with patient's reported previous history  of ADHD it may be beneficial to trial wellbutrin. WE did discuss this could increase anxiety symptoms-however it seems as thought anxiety is stemming from ADHD symptoms and may improve with ADHD treatment.      - buPROPion (WELLBUTRIN XL) 150 MG 24 hr tablet; Take 1 tablet (150 mg) by mouth every morning.  - buPROPion (WELLBUTRIN XL) 300 MG 24 hr tablet; Take 1 tablet (300 mg) by mouth every morning.                Subjective   Jude is a 34 year old, presenting for the following health issues:  A.D.H.D      12/12/2024     3:38 PM   Additional Questions   Roomed by FREDIS Small     History of Present Illness       Mental Health Follow-up:  Patient presents to follow-up on Depression & Anxiety.Patient's depression since last visit has been:  Medium  The patient is having other symptoms associated with depression.  Patient's anxiety since last visit has been:  Bad  The patient is having other symptoms associated with anxiety.  Any significant life events: No  Patient is not feeling anxious or having panic attacks.  Patient has no concerns about alcohol or drug use.    Reason for visit:  Options for treatment of Adhd, Depression, Anxiety.  Symptom onset:  More than a month  Symptoms include:  Inattentive, memory problems, impulsiveness, anxiety, bouts of depression.  Symptom intensity:  Moderate  Symptom progression:  Worsening  Had these symptoms before:  Yes  Has tried/received treatment for these symptoms:  No  What makes it  better:  Tried many natural methods such as meditation and relaxation practices but nothing seems to work. He is missing 3 dose(s) of medications per week.  He is not taking prescribed medications regularly due to remembering to take.     Was inconclusive for ADHD. Concern that anxiety is contributing to symptoms as well.    Patient reports issues since he was a kid with forgetfulness, impulsivity-he reports he was diagnosed with ADHD as a child but has been unable to find documentation.                       Objective           Vitals:  No vitals were obtained today due to virtual visit.    Physical Exam   GENERAL: alert and no distress  EYES: Eyes grossly normal to inspection.  No discharge or erythema, or obvious scleral/conjunctival abnormalities.  RESP: No audible wheeze, cough, or visible cyanosis.    SKIN: Visible skin clear. No significant rash, abnormal pigmentation or lesions.  NEURO: Cranial nerves grossly intact.  Mentation and speech appropriate for age.  PSYCH: Appropriate affect, tone, and pace of words          Video-Visit Details    Type of service:  Video Visit   Originating Location (pt. Location): Home    Distant Location (provider location):  On-site  Platform used for Video Visit: Ashli  Signed Electronically by: JOSE CARLOS PANG CNP

## 2025-01-15 ENCOUNTER — E-VISIT (OUTPATIENT)
Dept: URGENT CARE | Facility: CLINIC | Age: 35
End: 2025-01-15
Payer: COMMERCIAL

## 2025-01-15 ENCOUNTER — NURSE TRIAGE (OUTPATIENT)
Dept: FAMILY MEDICINE | Facility: CLINIC | Age: 35
End: 2025-01-15

## 2025-01-15 DIAGNOSIS — J06.9 ACUTE UPPER RESPIRATORY INFECTION, UNSPECIFIED: Primary | ICD-10-CM

## 2025-01-15 NOTE — TELEPHONE ENCOUNTER
"Nurse Triage SBAR    Is this a 2nd Level Triage? YES, LICENSED PRACTITIONER REVIEW IS REQUIRED    Situation: Patient calling to request Tamiflu    Background: no known exposure.     Assessment:   Initial Assessment Questions  1. ONSET: \" Started on Monday  2. SEVERITY:Increase in cough, coughing up green phlegm.  3. SPUTUM:  GREEN.  4. HEMOPTYSIS:  NO Blood.  5. DIFFICULTY BREATHING: Only when coughing.  6. FEVER: reports Fever/Chills  7. CARDIAC HISTORY: NO  8. LUNG HISTORY: NO  9. PE RISK FACTORS: NO  10. OTHER SYMPTOMS: Fever/ Chills/ cough/ Green phlegm.   11. PREGNANCY: No  12. TRAVEL: no  Protocol Recommended Disposition:   Routine office f/u appointment    Recommendation:   RN advised E-visit.  No available appointment same day.      Routed to provider    Does the patient meet one of the following criteria for ADS visit consideration? No       Reason for Disposition   Fever present > 3 days (72 hours)    Additional Information   Negative: SEVERE difficulty breathing (e.g., struggling for each breath, speaks in single words)   Negative: Bluish (or gray) lips or face now   Negative: [1] Difficulty breathing AND [2] exposure to flames, smoke, or fumes   Negative: [1] Stridor AND [2] difficulty breathing   Negative: Sounds like a life-threatening emergency to the triager   Negative: [1] Previous asthma attacks AND [2] this feels like asthma attack   Negative: Dry cough (non-productive;  no sputum or minimal clear sputum)   Negative: [1] MODERATE difficulty breathing (e.g., speaks in phrases, SOB even at rest, pulse 100-120) AND [2] still present when not coughing   Negative: Chest pain  (Exception: MILD central chest pain, present only when coughing.)   Negative: Patient sounds very sick or weak to the triager   Negative: [1] MILD difficulty breathing (e.g., minimal/no SOB at rest, SOB with walking, pulse <100) AND [2] still present when not coughing   Negative: [1] Coughed up blood AND [2] > 1 tablespoon (15 ml) "   (Exception: Blood-tinged sputum.)   Negative: Fever > 103 F (39.4 C)   Negative: [1] Fever > 101 F (38.3 C) AND [2] age > 60 years   Negative: [1] Fever > 100.0 F (37.8 C) AND [2] bedridden (e.g., CVA, chronic illness, recovering from surgery)   Negative: [1] Fever > 100.0 F (37.8 C) AND [2] diabetes mellitus or weak immune system (e.g., HIV positive, cancer chemo, splenectomy, organ transplant, chronic steroids)   Negative: Wheezing is present   Negative: SEVERE coughing spells (e.g., whooping sound after coughing, vomiting after coughing)   Negative: [1] Continuous (nonstop) coughing interferes with work or school AND [2] no improvement using cough treatment per Care Advice   Negative: Coughing up samra-colored (reddish-brown) sputum   Negative: Fever present > 3 days (72 hours)   Negative: [1] Fever returns after gone for over 24 hours AND [2] symptoms worse or not improved   Negative: [1] Using nasal washes and pain medicine > 24 hours AND [2] sinus pain (around cheekbone or eye) persists   Negative: Earache   Negative: [1] Known COPD or other severe lung disease (i.e., bronchiectasis, cystic fibrosis, lung surgery) AND [2] worsening symptoms (i.e., increased sputum purulence or amount, increased breathing difficulty   Negative: Cough has been present for > 3 weeks   Negative: [1] Nasal discharge AND [2] present > 10 days   Negative: [1] Coughed up blood-tinged sputum AND [2] more than once   Negative: Exposure to TB (Tuberculosis)   Negative: Cough   Negative: Cough with cold symptoms (e.g., runny nose, postnasal drip, throat clearing)   Negative: ALSO, mild central chest pain occurs only when coughing   Negative: ALSO, mild vomiting occurs only when coughing    Protocols used: Cough - Acute Gwkdslfihd-D-GH, Influenza - CDC 2018 Flu on Call (Reg [place] Pat & TM Off.) [id number]

## 2025-01-15 NOTE — PATIENT INSTRUCTIONS
Jude,    Thank you for choosing us for your care. I have placed an order for a lab test(s). View your full visit summary for details by clicking on the link below. You can schedule a lab only appointment right here in [employer], or by calling [phone].    You will receive your lab results and next steps via [employer] when the lab results return.    Sincerely,  Chiara Zurita PA-C

## 2025-01-15 NOTE — TELEPHONE ENCOUNTER
We can have him get tested for influenza, I would not recommend starting on Tamiflu without a positive influenza test or known positive contact.  This could be accomplished in the lab, where they can do these swabs, or he can have a visit to the urgent care.

## 2025-01-16 NOTE — TELEPHONE ENCOUNTER
Patient should be seen for an in person appointment so we can listen to his lungs to see if there is any concern for pneumonia.  We also can do flu testing at the time of the appointment, however his symptoms have been present for more than 48 hours, so he is not a candidate for Tamiflu.    Antonio Richardson MD

## 2025-01-16 NOTE — TELEPHONE ENCOUNTER
Call to patient to relay PCP message. No answer. Left message to call back.     MyChart message sent, as well.

## 2025-01-16 NOTE — TELEPHONE ENCOUNTER
RN called patient and advised of Dr. Benavides's recommendations.  Patient assisted in scheduling a Lab Only appointment tomorrow am.     Routing to Dr. Richardson as a FYI regarding testing and Tamiflu prescription.

## 2025-01-30 ENCOUNTER — PATIENT OUTREACH (OUTPATIENT)
Dept: CARE COORDINATION | Facility: CLINIC | Age: 35
End: 2025-01-30
Payer: COMMERCIAL

## 2025-02-01 ENCOUNTER — E-VISIT (OUTPATIENT)
Dept: FAMILY MEDICINE | Facility: CLINIC | Age: 35
End: 2025-02-01
Payer: COMMERCIAL

## 2025-02-01 DIAGNOSIS — F90.9 ATTENTION DEFICIT HYPERACTIVITY DISORDER (ADHD), UNSPECIFIED ADHD TYPE: Primary | ICD-10-CM

## 2025-02-01 PROCEDURE — 99207 PR NON-BILLABLE SERV PER CHARTING: CPT | Performed by: FAMILY MEDICINE

## 2025-02-01 ASSESSMENT — ANXIETY QUESTIONNAIRES
2. NOT BEING ABLE TO STOP OR CONTROL WORRYING: SEVERAL DAYS
GAD7 TOTAL SCORE: 9
8. IF YOU CHECKED OFF ANY PROBLEMS, HOW DIFFICULT HAVE THESE MADE IT FOR YOU TO DO YOUR WORK, TAKE CARE OF THINGS AT HOME, OR GET ALONG WITH OTHER PEOPLE?: VERY DIFFICULT
4. TROUBLE RELAXING: MORE THAN HALF THE DAYS
IF YOU CHECKED OFF ANY PROBLEMS ON THIS QUESTIONNAIRE, HOW DIFFICULT HAVE THESE PROBLEMS MADE IT FOR YOU TO DO YOUR WORK, TAKE CARE OF THINGS AT HOME, OR GET ALONG WITH OTHER PEOPLE: VERY DIFFICULT
GAD7 TOTAL SCORE: 9
7. FEELING AFRAID AS IF SOMETHING AWFUL MIGHT HAPPEN: NOT AT ALL
7. FEELING AFRAID AS IF SOMETHING AWFUL MIGHT HAPPEN: NOT AT ALL
1. FEELING NERVOUS, ANXIOUS, OR ON EDGE: SEVERAL DAYS
3. WORRYING TOO MUCH ABOUT DIFFERENT THINGS: SEVERAL DAYS
6. BECOMING EASILY ANNOYED OR IRRITABLE: SEVERAL DAYS
GAD7 TOTAL SCORE: 9
5. BEING SO RESTLESS THAT IT IS HARD TO SIT STILL: NEARLY EVERY DAY

## 2025-02-01 ASSESSMENT — PATIENT HEALTH QUESTIONNAIRE - PHQ9
SUM OF ALL RESPONSES TO PHQ QUESTIONS 1-9: 12
10. IF YOU CHECKED OFF ANY PROBLEMS, HOW DIFFICULT HAVE THESE PROBLEMS MADE IT FOR YOU TO DO YOUR WORK, TAKE CARE OF THINGS AT HOME, OR GET ALONG WITH OTHER PEOPLE: VERY DIFFICULT
SUM OF ALL RESPONSES TO PHQ QUESTIONS 1-9: 12

## 2025-02-02 ENCOUNTER — MYC REFILL (OUTPATIENT)
Dept: FAMILY MEDICINE | Facility: CLINIC | Age: 35
End: 2025-02-02
Payer: COMMERCIAL

## 2025-02-02 DIAGNOSIS — J45.40 MODERATE PERSISTENT ASTHMA WITHOUT COMPLICATION: Primary | ICD-10-CM

## 2025-02-03 NOTE — TELEPHONE ENCOUNTER
Clinic RN: Please investigate patient's chart or contact patient if the information cannot be found because the medication is listed as historical or discontinued. Confirm patient is taking this medication. Document findings and route refill encounter to provider for approval or denial.

## 2025-02-03 NOTE — PATIENT INSTRUCTIONS
Thank you for choosing us for your care. I think an in-clinic or virtual visit would be the best next step based on your symptoms. Please schedule a clinic appointment; you won t be charged for this eVisit.      You can schedule an appointment by clicking here in [employer], or call [phone].

## 2025-02-04 RX ORDER — ALBUTEROL SULFATE 0.83 MG/ML
2.5 SOLUTION RESPIRATORY (INHALATION) EVERY 6 HOURS PRN
Qty: 90 ML | Refills: 0 | Status: SHIPPED | OUTPATIENT
Start: 2025-02-04

## 2025-02-13 ENCOUNTER — PATIENT OUTREACH (OUTPATIENT)
Dept: CARE COORDINATION | Facility: CLINIC | Age: 35
End: 2025-02-13
Payer: COMMERCIAL

## 2025-05-18 ENCOUNTER — HEALTH MAINTENANCE LETTER (OUTPATIENT)
Age: 35
End: 2025-05-18